# Patient Record
Sex: FEMALE | Race: WHITE | NOT HISPANIC OR LATINO | URBAN - METROPOLITAN AREA
[De-identification: names, ages, dates, MRNs, and addresses within clinical notes are randomized per-mention and may not be internally consistent; named-entity substitution may affect disease eponyms.]

---

## 2023-04-17 ENCOUNTER — HOSPITAL ENCOUNTER (OUTPATIENT)
Facility: CLINIC | Age: 18
Discharge: HOME | End: 2023-04-17
Attending: FAMILY MEDICINE
Payer: COMMERCIAL

## 2023-04-17 VITALS
HEART RATE: 100 BPM | DIASTOLIC BLOOD PRESSURE: 77 MMHG | SYSTOLIC BLOOD PRESSURE: 101 MMHG | TEMPERATURE: 97.2 F | OXYGEN SATURATION: 96 % | BODY MASS INDEX: 28.35 KG/M2 | WEIGHT: 160 LBS | RESPIRATION RATE: 16 BRPM | HEIGHT: 63 IN

## 2023-04-17 DIAGNOSIS — R51.9 ACUTE NONINTRACTABLE HEADACHE, UNSPECIFIED HEADACHE TYPE: Primary | ICD-10-CM

## 2023-04-17 PROCEDURE — S9083 URGENT CARE CENTER GLOBAL: HCPCS | Performed by: NURSE PRACTITIONER

## 2023-04-17 PROCEDURE — 99203 OFFICE O/P NEW LOW 30 MIN: CPT | Performed by: NURSE PRACTITIONER

## 2023-04-17 RX ORDER — NORETHINDRONE ACETATE AND ETHINYL ESTRADIOL .02; 1 MG/1; MG/1
1 TABLET ORAL DAILY
COMMUNITY
Start: 2023-03-24 | End: 2023-09-20

## 2023-04-17 RX ORDER — ISOTRETINOIN 40 MG/1
CAPSULE ORAL
COMMUNITY
Start: 2023-03-06 | End: 2023-09-20

## 2023-04-17 RX ORDER — DOXYCYCLINE 100 MG/1
CAPSULE ORAL
COMMUNITY
Start: 2023-02-02 | End: 2023-09-20

## 2023-04-17 RX ORDER — NAPROXEN 500 MG/1
500 TABLET ORAL EVERY 12 HOURS PRN
Qty: 20 TABLET | Refills: 0 | Status: SHIPPED | OUTPATIENT
Start: 2023-04-17 | End: 2023-09-20

## 2023-04-17 NOTE — ED ATTESTATION NOTE
I was present in the office and provided direct supervison.     Melissa Chahal MD  04/17/23 1503

## 2023-04-17 NOTE — Clinical Note
Skylar Reynolds was seen and treated in our urgent care on 4/17/2023.  She may return to school on 04/19/2023.  Please excuse Skylar for any associated absences.     If you have any questions or concerns, please don't hesitate to call.      Nano Dupree, DNP

## 2023-04-17 NOTE — ED PROVIDER NOTES
[facility]  Urgent Care, Occupational Health, & Travel Medicine  Emergency Medicine/Urgent Care Note  Nano Dupree, ROWAN, DILIP, FNP-BC  Nurse Practitioner    HISTORY OF PRESENT ILLNESS     18 yo patient presenting today for headache symptoms.    Reports:   • Intermittent tension-like headaches ongoing for the last week or so.   • Headaches are mostly frontal. No specific occipital presentation or neck pain/stiffness associated.   • On URI symptoms associated.   • Reports a subj fever last night, but afebrile today. No chills or body aches. Does not fele sick otherwise.   • She states headaches are periodic/intermittent - they self-resolve with tylenol use PRN and rest, but then return in the morning after a night of sleep/rest.     Negatives:  • Headaches are not sharp/shooting. Not waking up in the middle of the night with headaches.   • Reports decreased appetite but no active nausea or recent vomiting.   • Denies blurred vision, diplopia, focal weakness, numbness, tingling, paresthesias.  • No gait disturbance, lack of coordination/decreased coordination.  • There was no recent injury, fall, etc that could explain the symptoms today.   • Does not have a history of headaches or migraines.   • The headaches do not directly result in syncope, fatigue, or vertigo/dizzinness. No focal neuro signs.   • Not triggered by cough or exertion.   • No MS or personality changes with the headache.   • No onset with exercise.   • No tenderness over temples.   • No pattern worsening since onset. No red-flags.           PAST HISTORY     Reviewed from Nursing Triage:         History reviewed. No pertinent past medical history.    History reviewed. No pertinent surgical history.    History reviewed. No pertinent family history.    Social History     Tobacco Use   • Smoking status: Never   • Smokeless tobacco: Never   Vaping Use   • Vaping status: Never Used   Substance Use Topics   • Alcohol use: Yes   • Drug use: Never         REVIEW OF SYSTEMS     • CONST: No objective fevers. No chills. + tiredness/low energy.   • HEENT: + headaches (see neuro). No ear pain/pressure. No difficulty hearing. No hearing changes. No eye-related symptoms. No vision changes. No nasal congestion/nose or sinus pain. No throat pain or painful swallowing.   • NECK: No symptoms reported.   • CV: No CP, palpitations. No edema.   • RESP: No SOB, KNUTSON, cough, wheezing.   • MSK: No pain. Gait remains normal.   • NEURO: + headaches - intermittent and periodic - see HPI. No lightheadedness, dizziness. No n/t. Feels tired/low energy.   • SKIN: No changes reported. No rashes.        VITALS     ED Vitals    Date/Time Temp Pulse Resp BP SpO2 [facility]   04/17/23 1351 36.2 °C (97.2 °F) 100 16 101/77 96 % [facility]             PHYSICAL EXAM     General  • NAD. No pain.   • No fever, chills, tremors at this time.     HEENT  • Head: Normocephalic. Atraumatic.   • Ears: Canals B/L WNL. TMs WNL.   • Eyes: PERRL/3mm. EOMI. No nystagmus. Vision grossly normal. No photophobia.  • Nose: No symptoms reported. No congestion, facial pain, sinus pain. No nasal mucous noted.  • Throat: No pain. No vocal hoarseness.    Chest/Respiratory  • No SOB/KNUTSON. AVSS.   • LCTABL.   • SPO2 WNL on RA.     Cardiovascular  • AVSS. No CP, palpitations, SOB, KNUTSON.   • Carotids are clear on exam B/L. No JVD.   • Peripheral pulses are +2 and WNL.   • No LE edema.   • S1/S2 WNL. Normal rate/rhythm.     MSK  • Normal gait. No pain reported.    Skin/Integ  • Visible skin is w/o abnormalities.     Neuro  • AAO x 3.   • No obvious memory impairments noted or reported.   • Answers all questions appropriately.  • Neuro exam and cranial nerve tests are WNL.   • PERLL/3mm, EOMI, no nystagmus. Hearing and vision are grossly baseline. No dizziness on ambulation or position changes. No lightheadedness or evidence of pre-syncope. Facial movements are equal B/L. No extremity or muscle weakness. Gait is normal. Does not appear  overly fatigued at this time. No distress. Sensation is normal. No tingling or numbness anywhere.         PROCEDURES     Procedures     DATA     Results     None          No orders to display       Scoring tools                                    MDM / ED COURSE / CLINICAL IMPRESSION / DISPO     Clinical Impression      Acute nonintractable headache, unspecified headache type       MDM:    · See overview, HPI, ROS, PE.  No red-flag headache symptoms present at time of exam.  · Neurologic exam completed: all findings on ROS/PE were WNL/baseline.   · Risk factors and triggers r/t headaches and migraines reviewed with the patient.   · Diet changes recommended: Diet low in tyramine-containing foods, diet low in fatty and greasy foods, increasing PO fluids, decreasing dietary caffeine.   · Sent patient prescription for Naproxen 500 BID w/ food (PRN). Will not use with any other OTC NSAIDs.   · Wrote patient a note for missed classes as requested.   · Recommended follow-up with PCP providers from [facility] if she cannot see her own PCP for a further work-up. She verbalized an understanding of this.   · Recommended ER for any worsening.      Disposition  Discharge. Strict return precautions reviewed.              Nano Dupree, ROWAN  04/17/23 1421

## 2023-04-17 NOTE — DISCHARGE INSTRUCTIONS
Take the naproxen twice daily as prescribed. Take each dose with food. Do not use with other OTC NSAIDs (ibuprofen or related medications, aspirin, etc).     Follow-up with this clinic if you are not improving:     [facility]  [address]  [address]  [place] [address] [phone]     Go to the ER for any worsening in headaches, vision changes, vomiting episodes, hearing changes or ringing in the ears, neck pain or stiffness, fever, chills, body aches, chest pain, difficulty breathing, etc.

## 2023-04-17 NOTE — ED NOTES
Bed: 20  Expected date:   Expected time:   Means of arrival: Car  Comments:     Sudha Arce MA  04/17/23 2289

## 2023-04-22 ENCOUNTER — APPOINTMENT (EMERGENCY)
Dept: CT IMAGING | Facility: HOSPITAL | Age: 18
End: 2023-04-22

## 2023-04-22 ENCOUNTER — APPOINTMENT (EMERGENCY)
Dept: ULTRASOUND IMAGING | Facility: HOSPITAL | Age: 18
End: 2023-04-22

## 2023-04-22 ENCOUNTER — HOSPITAL ENCOUNTER (INPATIENT)
Facility: HOSPITAL | Age: 18
LOS: 3 days | Discharge: HOME | End: 2023-04-25
Attending: EMERGENCY MEDICINE | Admitting: FAMILY MEDICINE

## 2023-04-22 DIAGNOSIS — R11.0 NAUSEA: ICD-10-CM

## 2023-04-22 DIAGNOSIS — R74.01 TRANSAMINITIS: ICD-10-CM

## 2023-04-22 DIAGNOSIS — R74.8 ELEVATED LIVER ENZYMES: Primary | ICD-10-CM

## 2023-04-22 DIAGNOSIS — D73.5 SPLENIC INFARCT: ICD-10-CM

## 2023-04-22 PROBLEM — E80.6 HYPERBILIRUBINEMIA: Status: ACTIVE | Noted: 2023-04-22

## 2023-04-22 PROBLEM — R11.2 NAUSEA AND VOMITING: Status: ACTIVE | Noted: 2023-04-22

## 2023-04-22 PROBLEM — L70.9 ACNE: Status: ACTIVE | Noted: 2023-04-22

## 2023-04-22 PROBLEM — Z78.9 ALCOHOL USE: Status: ACTIVE | Noted: 2023-04-22

## 2023-04-22 LAB
ALBUMIN SERPL BCP-MCNC: 3.9 G/DL (ref 3.5–5)
ALP SERPL-CCNC: 289 U/L (ref 34–104)
ALT SERPL W P-5'-P-CCNC: 163 U/L (ref 7–52)
AMPHETAMINES SERPL QL SCN: NEGATIVE
ANION GAP SERPL CALCULATED.3IONS-SCNC: 8 MMOL/L (ref 4–13)
ANISOCYTOSIS BLD QL SMEAR: PRESENT
APAP SERPL-MCNC: <10 UG/ML (ref 10–20)
APTT PPP: 31 SECONDS (ref 23–37)
APTT PPP: 32 SECONDS (ref 23–37)
AST SERPL W P-5'-P-CCNC: 195 U/L (ref 13–39)
BACTERIA UR QL AUTO: ABNORMAL /HPF
BARBITURATES UR QL: NEGATIVE
BASOPHILS # BLD MANUAL: 0.1 THOUSAND/UL (ref 0–0.1)
BASOPHILS NFR MAR MANUAL: 1 % (ref 0–1)
BENZODIAZ UR QL: NEGATIVE
BILIRUB DIRECT SERPL-MCNC: 3.81 MG/DL (ref 0–0.2)
BILIRUB SERPL-MCNC: 5.8 MG/DL (ref 0.2–1)
BILIRUB UR QL STRIP: ABNORMAL
BUN SERPL-MCNC: 7 MG/DL (ref 5–25)
CALCIUM SERPL-MCNC: 9.2 MG/DL (ref 8.4–10.2)
CHLORIDE SERPL-SCNC: 100 MMOL/L (ref 96–108)
CK SERPL-CCNC: 29 U/L (ref 26–192)
CLARITY UR: ABNORMAL
CO2 SERPL-SCNC: 27 MMOL/L (ref 21–32)
COCAINE UR QL: NEGATIVE
COLOR UR: ABNORMAL
CREAT SERPL-MCNC: 0.8 MG/DL (ref 0.6–1.3)
EOSINOPHIL # BLD MANUAL: 0.1 THOUSAND/UL (ref 0–0.4)
EOSINOPHIL NFR BLD MANUAL: 1 % (ref 0–6)
ERYTHROCYTE [DISTWIDTH] IN BLOOD BY AUTOMATED COUNT: 15.1 % (ref 11.6–15.1)
ETHANOL SERPL-MCNC: <10 MG/DL
EXT PREGNANCY TEST URINE: NEGATIVE
EXT. CONTROL: NORMAL
GFR SERPL CREATININE-BSD FRML MDRD: 107 ML/MIN/1.73SQ M
GLUCOSE SERPL-MCNC: 86 MG/DL (ref 65–140)
GLUCOSE UR STRIP-MCNC: ABNORMAL MG/DL
HCT VFR BLD AUTO: 43.1 % (ref 34.8–46.1)
HGB BLD-MCNC: 14.1 G/DL (ref 11.5–15.4)
HGB UR QL STRIP.AUTO: ABNORMAL
INR PPP: 0.99 (ref 0.84–1.19)
KETONES UR STRIP-MCNC: ABNORMAL MG/DL
LDH SERPL-CCNC: 714 U/L (ref 140–271)
LEUKOCYTE ESTERASE UR QL STRIP: NEGATIVE
LG PLATELETS BLD QL SMEAR: PRESENT
LIPASE SERPL-CCNC: 20 U/L (ref 11–82)
LYMPHOCYTES # BLD AUTO: 6.53 THOUSAND/UL (ref 0.6–4.47)
LYMPHOCYTES # BLD AUTO: 65 % (ref 14–44)
MCH RBC QN AUTO: 27.9 PG (ref 26.8–34.3)
MCHC RBC AUTO-ENTMCNC: 32.7 G/DL (ref 31.4–37.4)
MCV RBC AUTO: 85 FL (ref 82–98)
METHADONE UR QL: NEGATIVE
MICROCYTES BLD QL AUTO: PRESENT
MONOCYTES # BLD AUTO: 0.5 THOUSAND/UL (ref 0–1.22)
MONOCYTES NFR BLD: 5 % (ref 4–12)
NEUTROPHILS # BLD MANUAL: 2.21 THOUSAND/UL (ref 1.85–7.62)
NEUTS SEG NFR BLD AUTO: 22 % (ref 43–75)
NITRITE UR QL STRIP: POSITIVE
NON-SQ EPI CELLS URNS QL MICRO: ABNORMAL /HPF
OPIATES UR QL SCN: NEGATIVE
OXYCODONE+OXYMORPHONE UR QL SCN: NEGATIVE
PCP UR QL: NEGATIVE
PH UR STRIP.AUTO: 6.5 [PH]
PLATELET # BLD AUTO: 137 THOUSANDS/UL (ref 149–390)
PLATELET BLD QL SMEAR: ABNORMAL
PMV BLD AUTO: 10.9 FL (ref 8.9–12.7)
POTASSIUM SERPL-SCNC: 3.8 MMOL/L (ref 3.5–5.3)
PROT SERPL-MCNC: 7.8 G/DL (ref 6.4–8.4)
PROT UR STRIP-MCNC: ABNORMAL MG/DL
PROTHROMBIN TIME: 13.2 SECONDS (ref 11.6–14.5)
RBC # BLD AUTO: 5.06 MILLION/UL (ref 3.81–5.12)
RBC #/AREA URNS AUTO: ABNORMAL /HPF
RBC MORPH BLD: PRESENT
SALICYLATES SERPL-MCNC: <5 MG/DL (ref 3–20)
SODIUM SERPL-SCNC: 135 MMOL/L (ref 135–147)
SP GR UR STRIP.AUTO: 1.02 (ref 1–1.03)
THC UR QL: POSITIVE
TRIGL SERPL-MCNC: 275 MG/DL
UROBILINOGEN UR QL STRIP.AUTO: 4 E.U./DL
VARIANT LYMPHS # BLD AUTO: 6 %
WBC # BLD AUTO: 10.05 THOUSAND/UL (ref 4.31–10.16)
WBC #/AREA URNS AUTO: ABNORMAL /HPF

## 2023-04-22 RX ORDER — ONDANSETRON 2 MG/ML
4 INJECTION INTRAMUSCULAR; INTRAVENOUS EVERY 6 HOURS PRN
Status: DISCONTINUED | OUTPATIENT
Start: 2023-04-22 | End: 2023-04-25 | Stop reason: HOSPADM

## 2023-04-22 RX ORDER — IBUPROFEN 400 MG/1
400 TABLET ORAL EVERY 6 HOURS PRN
Status: DISCONTINUED | OUTPATIENT
Start: 2023-04-22 | End: 2023-04-22

## 2023-04-22 RX ORDER — HEPARIN SODIUM 10000 [USP'U]/100ML
3-30 INJECTION, SOLUTION INTRAVENOUS
Status: DISPENSED | OUTPATIENT
Start: 2023-04-22 | End: 2023-04-24

## 2023-04-22 RX ORDER — NORETHINDRONE ACETATE AND ETHINYL ESTRADIOL 1; .02 MG/1; MG/1
1 TABLET ORAL
Status: DISCONTINUED | OUTPATIENT
Start: 2023-04-22 | End: 2023-04-22

## 2023-04-22 RX ORDER — IBUPROFEN 200 MG
200 TABLET ORAL EVERY 6 HOURS PRN
Status: DISCONTINUED | OUTPATIENT
Start: 2023-04-22 | End: 2023-04-25 | Stop reason: HOSPADM

## 2023-04-22 RX ORDER — LANOLIN ALCOHOL/MO/W.PET/CERES
3 CREAM (GRAM) TOPICAL
Status: DISCONTINUED | OUTPATIENT
Start: 2023-04-22 | End: 2023-04-25 | Stop reason: HOSPADM

## 2023-04-22 RX ORDER — HEPARIN SODIUM 1000 [USP'U]/ML
6000 INJECTION, SOLUTION INTRAVENOUS; SUBCUTANEOUS EVERY 6 HOURS PRN
Status: ACTIVE | OUTPATIENT
Start: 2023-04-22 | End: 2023-04-24

## 2023-04-22 RX ORDER — KETOROLAC TROMETHAMINE 30 MG/ML
30 INJECTION, SOLUTION INTRAMUSCULAR; INTRAVENOUS ONCE
Status: COMPLETED | OUTPATIENT
Start: 2023-04-22 | End: 2023-04-22

## 2023-04-22 RX ORDER — NORETHINDRONE ACETATE AND ETHINYL ESTRADIOL 1; .02 MG/1; MG/1
1 TABLET ORAL DAILY
COMMUNITY
Start: 2023-03-24 | End: 2023-04-25

## 2023-04-22 RX ORDER — HYDROXYZINE HYDROCHLORIDE 25 MG/1
25 TABLET, FILM COATED ORAL EVERY 6 HOURS PRN
Status: DISCONTINUED | OUTPATIENT
Start: 2023-04-22 | End: 2023-04-25 | Stop reason: HOSPADM

## 2023-04-22 RX ORDER — ISOTRETINOIN 40 MG/1
CAPSULE ORAL
COMMUNITY
Start: 2023-03-06 | End: 2023-04-25

## 2023-04-22 RX ORDER — PREDNISONE 20 MG/1
40 TABLET ORAL ONCE
Status: COMPLETED | OUTPATIENT
Start: 2023-04-22 | End: 2023-04-22

## 2023-04-22 RX ORDER — ACETYLCYSTEINE 200 MG/ML
3 SOLUTION ORAL; RESPIRATORY (INHALATION) ONCE
Status: DISCONTINUED | OUTPATIENT
Start: 2023-04-22 | End: 2023-04-22

## 2023-04-22 RX ORDER — DIPHENHYDRAMINE HCL 25 MG
25 TABLET ORAL EVERY 6 HOURS PRN
Status: DISCONTINUED | OUTPATIENT
Start: 2023-04-22 | End: 2023-04-25 | Stop reason: HOSPADM

## 2023-04-22 RX ORDER — HEPARIN SODIUM 1000 [USP'U]/ML
3000 INJECTION, SOLUTION INTRAVENOUS; SUBCUTANEOUS EVERY 6 HOURS PRN
Status: DISPENSED | OUTPATIENT
Start: 2023-04-22 | End: 2023-04-24

## 2023-04-22 RX ORDER — ONDANSETRON 2 MG/ML
4 INJECTION INTRAMUSCULAR; INTRAVENOUS ONCE
Status: COMPLETED | OUTPATIENT
Start: 2023-04-22 | End: 2023-04-22

## 2023-04-22 RX ADMIN — HEPARIN SODIUM 18 UNITS/KG/HR: 10000 INJECTION, SOLUTION INTRAVENOUS at 17:53

## 2023-04-22 RX ADMIN — SODIUM CHLORIDE 1000 ML: 0.9 INJECTION, SOLUTION INTRAVENOUS at 14:22

## 2023-04-22 RX ADMIN — PREDNISONE 40 MG: 20 TABLET ORAL at 19:44

## 2023-04-22 RX ADMIN — MELATONIN TAB 3 MG 3 MG: 3 TAB at 23:07

## 2023-04-22 RX ADMIN — ACETYLCYSTEINE 11355 MG: 200 INJECTION, SOLUTION INTRAVENOUS at 18:00

## 2023-04-22 RX ADMIN — DIPHENHYDRAMINE HYDROCHLORIDE 25 MG: 25 TABLET ORAL at 19:44

## 2023-04-22 RX ADMIN — HYDROXYZINE HYDROCHLORIDE 25 MG: 25 TABLET ORAL at 23:07

## 2023-04-22 RX ADMIN — ONDANSETRON 4 MG: 2 INJECTION INTRAMUSCULAR; INTRAVENOUS at 18:53

## 2023-04-22 RX ADMIN — ONDANSETRON 4 MG: 2 INJECTION INTRAMUSCULAR; INTRAVENOUS at 14:22

## 2023-04-22 RX ADMIN — IOHEXOL 100 ML: 350 INJECTION, SOLUTION INTRAVENOUS at 14:42

## 2023-04-22 RX ADMIN — KETOROLAC TROMETHAMINE 30 MG: 30 INJECTION, SOLUTION INTRAMUSCULAR; INTRAVENOUS at 14:22

## 2023-04-22 NOTE — ED PROVIDER NOTES
History  Chief Complaint   Patient presents with   • Headache     Pt was having headaches and nausea, seen at urgent care and given medicine for headache, pt reported symptoms continued so they went to another ER who told them liver enzymes were elevated, states today they started with rash on legs and back as well as yellowing of sclera      12-year-old female presents to the ED for evaluation of headache nausea for several days  Patient states that she was seen outpatient at urgent care initially and given headache medication  She had no resolution of her symptoms so she went to another ED and was told that her liver enzymes are elevated  She states that she has been taking tylenol for her headache and is on accutane for acne and also indulges in alcohol-at times heavily  She has been having a rash to her legs recently and yellowing of the skin  Prior to Admission Medications   Prescriptions Last Dose Informant Patient Reported? Taking? ISOtretinoin (ACCUTANE) 40 MG capsule   Yes Yes   Sig: One by mouth twice daily with a fatty meal   norethindrone-ethinyl estradiol (MICROGESTIN 1/20) 1-20 MG-MCG per tablet   Yes Yes   Sig: Take 1 tablet by mouth daily      Facility-Administered Medications: None       History reviewed  No pertinent past medical history  History reviewed  No pertinent surgical history  History reviewed  No pertinent family history  I have reviewed and agree with the history as documented  E-Cigarette/Vaping   • E-Cigarette Use Never User      E-Cigarette/Vaping Substances     Social History     Tobacco Use   • Smoking status: Never   • Smokeless tobacco: Never   Vaping Use   • Vaping Use: Never used   Substance Use Topics   • Alcohol use: Not Currently   • Drug use: Not Currently       Review of Systems   Constitutional: Negative for chills and fever  HENT: Negative for ear pain and sore throat  Eyes: Negative for pain and visual disturbance     Respiratory: Negative for cough and shortness of breath  Cardiovascular: Negative for chest pain and palpitations  Gastrointestinal: Positive for abdominal pain and nausea  Negative for vomiting  Genitourinary: Negative for dysuria and hematuria  Musculoskeletal: Negative for arthralgias and back pain  Skin: Positive for color change and rash  Neurological: Negative for seizures and syncope  All other systems reviewed and are negative  Physical Exam  Physical Exam  Vitals and nursing note reviewed  Constitutional:       Appearance: Normal appearance  She is well-developed and normal weight  She is not ill-appearing, toxic-appearing or diaphoretic  HENT:      Head: Normocephalic and atraumatic  Right Ear: External ear normal       Left Ear: External ear normal       Nose: Nose normal       Mouth/Throat:      Mouth: Mucous membranes are dry  Eyes:      General: Scleral icterus present  Extraocular Movements: Extraocular movements intact  Conjunctiva/sclera: Conjunctivae normal    Neck:      Vascular: No carotid bruit  Cardiovascular:      Rate and Rhythm: Normal rate and regular rhythm  Pulses: Normal pulses  Heart sounds: Normal heart sounds  No murmur heard  Pulmonary:      Effort: Pulmonary effort is normal  No respiratory distress  Breath sounds: No stridor  No wheezing or rhonchi  Abdominal:      General: Abdomen is flat  Bowel sounds are normal  There is no distension  Palpations: Abdomen is soft  There is no mass  Tenderness: There is no abdominal tenderness  There is no guarding or rebound  Hernia: No hernia is present  Musculoskeletal:         General: No swelling, tenderness, deformity or signs of injury  Normal range of motion  Cervical back: Normal range of motion and neck supple  No rigidity or tenderness  Right lower leg: No edema  Left lower leg: No edema  Lymphadenopathy:      Cervical: No cervical adenopathy     Skin:     General: Skin is warm and dry  Capillary Refill: Capillary refill takes less than 2 seconds  Coloration: Skin is jaundiced  Findings: No bruising or lesion  Comments: No rash noted to lower extremities as previously stated   Neurological:      General: No focal deficit present  Mental Status: She is alert and oriented to person, place, and time  Mental status is at baseline  Cranial Nerves: No cranial nerve deficit  Sensory: No sensory deficit  Motor: No weakness        Coordination: Coordination normal       Gait: Gait normal       Deep Tendon Reflexes: Reflexes normal    Psychiatric:         Mood and Affect: Mood normal          Vital Signs  ED Triage Vitals [04/22/23 1146]   Temperature Pulse Respirations Blood Pressure SpO2   98 1 °F (36 7 °C) 98 20 (!) 138/101 96 %      Temp Source Heart Rate Source Patient Position - Orthostatic VS BP Location FiO2 (%)   Temporal Monitor -- Right arm --      Pain Score       --           Vitals:    04/22/23 1146 04/22/23 1245   BP: (!) 138/101 105/66   Pulse: 98 90         Visual Acuity      ED Medications  Medications   sodium chloride 0 9 % bolus 1,000 mL (has no administration in time range)   ondansetron (ZOFRAN) injection 4 mg (has no administration in time range)   ketorolac (TORADOL) injection 30 mg (has no administration in time range)       Diagnostic Studies  Results Reviewed     Procedure Component Value Units Date/Time    POCT pregnancy, urine [[id number]]     Lab Status: No result     Manual Differential(PHLEBS Do Not Order) [[id number]]  (Abnormal) Collected: 04/22/23 1159    Lab Status: Final result Specimen: Blood from Arm, Left Updated: 04/22/23 1241     Segmented % 22 %      Lymphocytes % 65 %      Monocytes % 5 %      Eosinophils, % 1 %      Basophils % 1 %      Atypical Lymphocytes % 6 %      Absolute Neutrophils 2 21 Thousand/uL      Lymphocytes Absolute 6 53 Thousand/uL      Monocytes Absolute 0 50 Thousand/uL      Eosinophils Absolute 0 10 Thousand/uL      Basophils Absolute 0 10 Thousand/uL      Total Counted --     RBC Morphology Present     Anisocytosis Present     Microcytes Present     Platelet Estimate Borderline     Large Platelet Present    Comprehensive metabolic panel [[id number]]  (Abnormal) Collected: 04/22/23 1159    Lab Status: Final result Specimen: Blood from Arm, Left Updated: 04/22/23 1222     Sodium 135 mmol/L      Potassium 3 8 mmol/L      Chloride 100 mmol/L      CO2 27 mmol/L      ANION GAP 8 mmol/L      BUN 7 mg/dL      Creatinine 0 80 mg/dL      Glucose 86 mg/dL      Calcium 9 2 mg/dL       U/L       U/L      Alkaline Phosphatase 289 U/L      Total Protein 7 8 g/dL      Albumin 3 9 g/dL      Total Bilirubin 5 80 mg/dL      eGFR 107 ml/min/1 73sq m     Narrative:      [place] guidelines for Chronic Kidney Disease (CKD):   •  Stage 1 with normal or high GFR (GFR > 90 mL/min/1 73 square meters)  •  Stage 2 Mild CKD (GFR = 60-89 mL/min/1 73 square meters)  •  Stage 3A Moderate CKD (GFR = 45-59 mL/min/1 73 square meters)  •  Stage 3B Moderate CKD (GFR = 30-44 mL/min/1 73 square meters)  •  Stage 4 Severe CKD (GFR = 15-29 mL/min/1 73 square meters)  •  Stage 5 End Stage CKD (GFR <15 mL/min/1 73 square meters)  Note: GFR calculation is accurate only with a steady state creatinine    Lipase [[id number]]  (Normal) Collected: 04/22/23 1159    Lab Status: Final result Specimen: Blood from Arm, Left Updated: 04/22/23 1222     Lipase 20 u/L     CBC and differential [[id number]]  (Abnormal) Collected: 04/22/23 1159    Lab Status: Final result Specimen: Blood from Arm, Left Updated: 04/22/23 1216     WBC 10 05 Thousand/uL      RBC 5 06 Million/uL      Hemoglobin 14 1 g/dL      Hematocrit 43 1 %      MCV 85 fL      MCH 27 9 pg      MCHC 32 7 g/dL      RDW 15 1 %      MPV 10 9 fL      Platelets 367 Thousands/uL                  US right upper quadrant   Final Result by Quyen Solis MD "(04/22 1342)      1  Unremarkable liver and biliary tree  2   Contracted gallbladder, grossly normal       Workstation performed: [id number]         CT abdomen pelvis with contrast    (Results Pending)              Procedures  Procedures         ED Course  ED Course as of 04/22/23 1611   Sat Apr 22, 2023   1610 Case was discussed with the GI doctor and all labs pertinent to the patient's case were ordered  Patient being started on Mucomyst   Case was discussed with the hospitalist who accept the patient for admission  CRAFFT    Flowsheet Row Most Recent Value   CRAFFT Initial Screen: During the past 12 months, did you:    1  Drink any alcohol (more than a few sips)? No Filed at: 04/22/2023 1143   2  Smoke any marijuana or hashish No Filed at: 04/22/2023 1143   3  Use anything else to get high? (\"anything else\" includes illegal drugs, over the counter and prescription drugs, and things that you sniff or 'allen')? No Filed at: 04/22/2023 1143                                          MDM    Disposition  Final diagnoses:   None     ED Disposition     None      Follow-up Information    None         Patient's Medications   Discharge Prescriptions    No medications on file       No discharge procedures on file      PDMP Review     None          ED Provider  Electronically Signed by           Eric Muhammad DO  04/22/23 1611    "

## 2023-04-22 NOTE — PLAN OF CARE
Problem: PAIN - ADULT  Goal: Verbalizes/displays adequate comfort level or baseline comfort level  Description: Interventions:  - Encourage patient to monitor pain and request assistance  - Assess pain using appropriate pain scale  - Administer analgesics based on type and severity of pain and evaluate response  - Implement non-pharmacological measures as appropriate and evaluate response  - Consider cultural and social influences on pain and pain management  - Notify physician/advanced practitioner if interventions unsuccessful or patient reports new pain  Outcome: Progressing     Problem: DISCHARGE PLANNING  Goal: Discharge to home or other facility with appropriate resources  Description: INTERVENTIONS:  - Identify barriers to discharge w/patient and caregiver  - Arrange for needed discharge resources and transportation as appropriate  - Identify discharge learning needs (meds, wound care, etc )  - Arrange for interpretive services to assist at discharge as needed  - Refer to Case Management Department for coordinating discharge planning if the patient needs post-hospital services based on physician/advanced practitioner order or complex needs related to functional status, cognitive ability, or social support system  Outcome: Progressing     Problem: Knowledge Deficit  Goal: Patient/family/caregiver demonstrates understanding of disease process, treatment plan, medications, and discharge instructions  Description: Complete learning assessment and assess knowledge base    Interventions:  - Provide teaching at level of understanding  - Provide teaching via preferred learning methods  Outcome: Progressing     Problem: METABOLIC, FLUID AND ELECTROLYTES - ADULT  Goal: Electrolytes maintained within normal limits  Description: INTERVENTIONS:  - Monitor labs and assess patient for signs and symptoms of electrolyte imbalances  - Administer electrolyte replacement as ordered  - Monitor response to electrolyte replacements, including repeat lab results as appropriate  - Instruct patient on fluid and nutrition as appropriate  Outcome: Progressing     Problem: HEMATOLOGIC - ADULT  Goal: Maintains hematologic stability  Description: INTERVENTIONS  - Assess for signs and symptoms of bleeding or hemorrhage  - Monitor labs  - Administer supportive blood products/factors as ordered and appropriate  Outcome: Progressing

## 2023-04-22 NOTE — ASSESSMENT & PLAN NOTE
Present on admission with bilirubin direct of 3 8, total bilirubin 5 8  At previous ER/20 had total bilirubin of 1 7  Lab Results   Component Value Date    BILIDIR 3 81 (H) 04/22/2023    TBILI 5 80 (H) 04/22/2023   · Right upper quadrant ultrasound was unremarkable liver and biliary tree, contracted gallbladder which is grossly normal  · Decompressed gallbladder on CT abdomen pelvis  · Continue to trend

## 2023-04-22 NOTE — NURSING NOTE
Patient experiencing new red itchy blotching on her upper chest and back  No respiratory problems noted at this time  Jason Headland made aware

## 2023-04-22 NOTE — ASSESSMENT & PLAN NOTE
· Presents to the ER with headache and nausea for several days  · As needed Zofran -check EKG to monitor QTc  · Monitor for improvement

## 2023-04-22 NOTE — LETTER
[facility]  [address]  [place] [id number]  No information on file  April 25, 2023     Patient: Suzanne Rowe   YOB: 2005   Date of Visit: 4/22/2023       To Whom it May Concern:    Priyank Hammonds is under my professional care  She was seen in the hospital from 4/22/2023 to 04/25/23  She may return to school on 4/26/2023 with the following limitations please allow Nohemy to complete her school work virtually until further follow up is established  She may return to school to take her finals  If you have any questions or concerns, please don't hesitate to call           Sincerely,          Romy Bowen PA-C

## 2023-04-22 NOTE — H&P
[address]  H&P  Name: Georges Landin 21 y o  female I MRN: [id number]  Unit/Bed#: [facility] I Date of Admission: 4/22/2023   Date of Service: 4/22/2023 I Hospital Day: 0      Assessment/Plan   * Transaminitis  Assessment & Plan  · Presents to the ER for evaluation of headache and nausea for several days  · Had no resolution of symptoms, went to another ER 4/20 and told her liver enzymes were elevated  · In Care Everywhere , ALT 88 -has significantly increased today  · Also had total bilirubin of 1 7,   · 4/20 had right upper quadrant ultrasound with hepatic steatosis  · Did have hepatitis screening as an outpatient 2 days ago -negative  · Has been taking Tylenol for headaches, Accutane, does admit to some heavy alcohol use intermittently    Lab Results   Component Value Date     (H) 04/22/2023     (H) 04/22/2023   CT abdomen pelvis with splenomegaly/2 splenic infarcts, unremarkable liver  US right upper quadrant-unremarkable liver and biliary tree, gallbladder grossly normal  Unclear specific etiology  ER discussed with GI provider, recommendations below -formal GI consult on Monday  · Check the following lab work -INR, acute hepatitis panel, EBV, ethanol level, urine tox screen, tylenol level salicylate level, HANNAH, anti-smooth muscle antibody, IgG, LDH and haptoglobin  · Start NAC  · Follow LFTs and INR    Alcohol use  Assessment & Plan  · Reports alcohol use every weekend at school  · Continue to educate on lifestyle changes    Nausea  Assessment & Plan  · Presents to the ER with headache and nausea for several days  · As needed Zofran -check EKG to monitor QTc  · Monitor for improvement    Hyperbilirubinemia  Assessment & Plan  Present on admission with bilirubin direct of 3 8, total bilirubin 5 8  At previous ER/20 had total bilirubin of 1 7  Lab Results   Component Value Date    BILIDIR 3 81 (H) 04/22/2023    TBILI 5 80 (H) 04/22/2023   · Right upper "quadrant ultrasound was unremarkable liver and biliary tree, contracted gallbladder which is grossly normal  · Decompressed gallbladder on CT abdomen pelvis  · Continue to trend      Splenic infarct  Assessment & Plan  · Noted on CT abdomen pelvis-splenomegaly with 2 splenic infarcts, unclear chronicity    Acne  Assessment & Plan  · Patient is on Accutane  · Potential etiology for elevated LFTs       VTE Pharmacologic Prophylaxis: VTE Score: 2 Low Risk (Score 0-2) - Encourage Ambulation  Code Status: Level 1 - Full Code   Discussion with family: Updated  (mother) at bedside  Anticipated Length of Stay: Patient will be admitted on an inpatient basis with an anticipated length of stay of greater than 2 midnights secondary to acute transaminitis  Total Time Spent on Date of Encounter in care of patient: 55 minutes This time was spent on one or more of the following: performing physical exam; counseling and coordination of care; obtaining or reviewing history; documenting in the medical record; reviewing/ordering tests, medications or procedures; communicating with other healthcare professionals and discussing with patient's family/caregivers  Chief Complaint: \"I was having a headache, chills, nausea, vomiting, and hives on my legs\"    History of Present Illness:  Carlos Mantilla is a 21 y o  female with a PMH of acne and HSP who presents with nausea, vomiting, headache, and hives  Patient states that she went to urgent care due to headaches, chills, nausea and vomiting  Patient was given medication for her headaches and was sent home  Patient states headaches do not get better, so she went to emergency room and they did an ultrasound of her right upper quadrant that showed hepatic steatosis  Patient was told not to drink alcohol or take Tylenol for the next 30 days  Patient symptoms still did not improve, so she presented today to the ER    Patient was found to have splenomegaly and elevated " LFTs, alk phos and bilirubin  Patient states she no longer has hives and is no longer itchy  States overall she is feeling a little better  Patient states that she drinks every weekend at college  Patient also states she has been taking all more Tylenol due to her increased headaches  Denies drug use  Patient also states that she has a history of HSP when she was younger  Review of Systems:  Review of Systems   Constitutional: Positive for chills  Negative for diaphoresis, fatigue and fever  HENT: Negative for congestion, rhinorrhea, sneezing and sore throat  Respiratory: Negative for cough, chest tightness, shortness of breath and wheezing  Cardiovascular: Negative for chest pain, palpitations and leg swelling  Gastrointestinal: Positive for nausea and vomiting  Negative for abdominal pain, constipation and diarrhea  Genitourinary: Negative for dysuria  Musculoskeletal: Negative for arthralgias  Skin: Positive for rash  Neurological: Positive for dizziness and headaches  Negative for tremors, syncope and weakness  Psychiatric/Behavioral: Negative for agitation, behavioral problems and confusion  Past Medical and Surgical History:   History reviewed  No pertinent past medical history  History reviewed  No pertinent surgical history  Meds/Allergies:  Prior to Admission medications    Medication Sig Start Date End Date Taking? Authorizing Provider   ISOtretinoin (ACCUTANE) 40 MG capsule One by mouth twice daily with a fatty meal 3/6/23  Yes Historical Provider, MD   norethindrone-ethinyl estradiol ([facility] 1/20) 1-20 MG-MCG per tablet Take 1 tablet by mouth daily 3/24/23  Yes Historical Provider, MD     I have reviewed home medications with patient personally  Allergies:    Allergies   Allergen Reactions   • Tamiflu [Oseltamivir] Hives       Social History:  Marital Status: Single   Occupation: College student  Patient Pre-hospital Living Situation: Home  Patient "Pre-hospital Level of Mobility: walks  Patient Pre-hospital Diet Restrictions: None  Substance Use History:   Social History     Substance and Sexual Activity   Alcohol Use Not Currently     Social History     Tobacco Use   Smoking Status Never   Smokeless Tobacco Never     Social History     Substance and Sexual Activity   Drug Use Not Currently       Family History:  History reviewed  No pertinent family history  Physical Exam:     Vitals:   Blood Pressure: 135/96 (04/22/23 1641)  Pulse: 95 (04/22/23 1641)  Temperature: 99 °F (37 2 °C) (04/22/23 1641)  Temp Source: Temporal (04/22/23 1146)  Respirations: (!) 24 (04/22/23 1641)  Height: 5' 3\" (160 cm) (04/22/23 1146)  Weight - Scale: 75 7 kg (166 lb 14 2 oz) (04/22/23 1146)  SpO2: 95 % (04/22/23 1641)    Physical Exam  Vitals reviewed  Constitutional:       General: She is not in acute distress  Appearance: Normal appearance  She is normal weight  She is not ill-appearing  HENT:      Head: Normocephalic and atraumatic  Nose: Nose normal       Mouth/Throat:      Mouth: Mucous membranes are moist       Pharynx: Oropharynx is clear  Eyes:      Extraocular Movements: Extraocular movements intact  Conjunctiva/sclera: Conjunctivae normal    Cardiovascular:      Rate and Rhythm: Normal rate and regular rhythm  Pulses: Normal pulses  Heart sounds: Normal heart sounds  No murmur heard  Pulmonary:      Effort: Pulmonary effort is normal  No respiratory distress  Breath sounds: Normal breath sounds  No wheezing or rhonchi  Abdominal:      General: Abdomen is flat  Bowel sounds are normal  There is no distension  Palpations: Abdomen is soft  Tenderness: There is no abdominal tenderness  There is no guarding  Musculoskeletal:         General: Normal range of motion  Cervical back: Normal range of motion  Right lower leg: No edema  Left lower leg: No edema  Skin:     General: Skin is warm        Findings: No " lesion or rash  Neurological:      General: No focal deficit present  Mental Status: She is alert and oriented to person, place, and time  Mental status is at baseline  Motor: No weakness  Psychiatric:         Mood and Affect: Mood normal          Behavior: Behavior normal          Thought Content: Thought content normal          Judgment: Judgment normal           Additional Data:     Lab Results:  Results from last 7 days   Lab Units 04/22/23  1159   WBC Thousand/uL 10 05   HEMOGLOBIN g/dL 14 1   HEMATOCRIT % 43 1   PLATELETS Thousands/uL 137*   LYMPHO PCT % 65*   MONO PCT % 5   EOS PCT % 1     Results from last 7 days   Lab Units 04/22/23  1159   SODIUM mmol/L 135   POTASSIUM mmol/L 3 8   CHLORIDE mmol/L 100   CO2 mmol/L 27   BUN mg/dL 7   CREATININE mg/dL 0 80   ANION GAP mmol/L 8   CALCIUM mg/dL 9 2   ALBUMIN g/dL 3 9   TOTAL BILIRUBIN mg/dL 5 80*   ALK PHOS U/L 289*   ALT U/L 163*   AST U/L 195*   GLUCOSE RANDOM mg/dL 86     Results from last 7 days   Lab Units 04/22/23  1613   INR  0 99                   Lines/Drains:  Invasive Devices     Peripheral Intravenous Line  Duration           Peripheral IV 04/22/23 Left Antecubital <1 day                     Imaging: Reviewed radiology reports from this admission including: abdominal/pelvic CT  CT abdomen pelvis with contrast   Final Result by Greg Thomson MD (04/22 0571)      Splenomegaly with 2 splenic infarcts  Trace bibasilar pleural effusions  The study was marked in [facility] for immediate notification  Workstation performed: [id number]         US right upper quadrant   Final Result by Julia Villatoro MD (04/22 4642)      1  Unremarkable liver and biliary tree  2   Contracted gallbladder, grossly normal       Workstation performed: [id number]             EKG and Other Studies Reviewed on Admission:   · EKG: EKG pending  ** Please Note: This note has been constructed using a voice recognition system   **

## 2023-04-22 NOTE — ASSESSMENT & PLAN NOTE
· Presents to the ER for evaluation of headache and nausea for several days  · Had no resolution of symptoms, went to another ER 4/20 and told her liver enzymes were elevated  · In Care Everywhere , ALT 88 -has significantly increased today  · Also had total bilirubin of 1 7,   · 4/20 had right upper quadrant ultrasound with hepatic steatosis  · Did have hepatitis screening as an outpatient 2 days ago -negative  · Has been taking Tylenol for headaches, Accutane, does admit to some heavy alcohol use intermittently    Lab Results   Component Value Date     (H) 04/22/2023     (H) 04/22/2023   CT abdomen pelvis with splenomegaly/2 splenic infarcts, unremarkable liver  US right upper quadrant-unremarkable liver and biliary tree, gallbladder grossly normal  Unclear specific etiology  ER discussed with GI provider, recommendations below -formal GI consult on Monday  · Check the following lab work -INR, acute hepatitis panel, EBV, ethanol level, urine tox screen, tylenol level salicylate level, HANNAH, anti-smooth muscle antibody, IgG, LDH and haptoglobin  · Start NAC  · Follow LFTs and INR

## 2023-04-23 PROBLEM — R11.0 NAUSEA: Status: RESOLVED | Noted: 2023-04-22 | Resolved: 2023-04-23

## 2023-04-23 LAB
ALBUMIN SERPL BCP-MCNC: 3.6 G/DL (ref 3.5–5)
ALP SERPL-CCNC: 265 U/L (ref 34–104)
ALT SERPL W P-5'-P-CCNC: 168 U/L (ref 7–52)
ANA SER QL IA: POSITIVE
ANION GAP SERPL CALCULATED.3IONS-SCNC: 8 MMOL/L (ref 4–13)
APTT PPP: 101 SECONDS (ref 23–37)
APTT PPP: 113 SECONDS (ref 23–37)
APTT PPP: 52 SECONDS (ref 23–37)
APTT PPP: 61 SECONDS (ref 23–37)
AST SERPL W P-5'-P-CCNC: 171 U/L (ref 13–39)
BILIRUB SERPL-MCNC: 5.16 MG/DL (ref 0.2–1)
BUN SERPL-MCNC: 6 MG/DL (ref 5–25)
CALCIUM SERPL-MCNC: 8.8 MG/DL (ref 8.4–10.2)
CHLORIDE SERPL-SCNC: 105 MMOL/L (ref 96–108)
CHOLEST SERPL-MCNC: 172 MG/DL
CO2 SERPL-SCNC: 25 MMOL/L (ref 21–32)
CREAT SERPL-MCNC: 0.66 MG/DL (ref 0.6–1.3)
ERYTHROCYTE [DISTWIDTH] IN BLOOD BY AUTOMATED COUNT: 15.1 % (ref 11.6–15.1)
GFR SERPL CREATININE-BSD FRML MDRD: 128 ML/MIN/1.73SQ M
GLUCOSE SERPL-MCNC: 133 MG/DL (ref 65–140)
HCT VFR BLD AUTO: 41.6 % (ref 34.8–46.1)
HDLC SERPL-MCNC: 25 MG/DL
HGB BLD-MCNC: 13.8 G/DL (ref 11.5–15.4)
INR PPP: 1.02 (ref 0.84–1.19)
LDLC SERPL CALC-MCNC: 89 MG/DL (ref 0–100)
MAGNESIUM SERPL-MCNC: 2.1 MG/DL (ref 1.9–2.7)
MCH RBC QN AUTO: 28.3 PG (ref 26.8–34.3)
MCHC RBC AUTO-ENTMCNC: 33.2 G/DL (ref 31.4–37.4)
MCV RBC AUTO: 85 FL (ref 82–98)
NONHDLC SERPL-MCNC: 147 MG/DL
NRBC BLD AUTO-RTO: 0 /100 WBCS
PLATELET # BLD AUTO: 151 THOUSANDS/UL (ref 149–390)
PMV BLD AUTO: 10.3 FL (ref 8.9–12.7)
POTASSIUM SERPL-SCNC: 4.4 MMOL/L (ref 3.5–5.3)
PROT SERPL-MCNC: 7.4 G/DL (ref 6.4–8.4)
PROTHROMBIN TIME: 13.5 SECONDS (ref 11.6–14.5)
RBC # BLD AUTO: 4.87 MILLION/UL (ref 3.81–5.12)
SODIUM SERPL-SCNC: 138 MMOL/L (ref 135–147)
TRIGL SERPL-MCNC: 288 MG/DL
WBC # BLD AUTO: 8.93 THOUSAND/UL (ref 4.31–10.16)

## 2023-04-23 RX ORDER — DOCUSATE SODIUM 100 MG/1
100 CAPSULE, LIQUID FILLED ORAL 2 TIMES DAILY
Status: DISCONTINUED | OUTPATIENT
Start: 2023-04-23 | End: 2023-04-25 | Stop reason: HOSPADM

## 2023-04-23 RX ADMIN — HEPARIN SODIUM 3000 UNITS: 1000 INJECTION INTRAVENOUS; SUBCUTANEOUS at 23:26

## 2023-04-23 RX ADMIN — DOCUSATE SODIUM 100 MG: 100 CAPSULE ORAL at 17:51

## 2023-04-23 RX ADMIN — IBUPROFEN 200 MG: 200 TABLET, FILM COATED ORAL at 22:14

## 2023-04-23 RX ADMIN — MELATONIN TAB 3 MG 3 MG: 3 TAB at 22:05

## 2023-04-23 RX ADMIN — HEPARIN SODIUM 13 UNITS/KG/HR: 10000 INJECTION, SOLUTION INTRAVENOUS at 16:30

## 2023-04-23 NOTE — PROGRESS NOTES
[address] [facility]  Progress Note  Name: Courtney Brothers  MRN: [id number]  Unit/Bed#: -01 I Date of Admission: 4/22/2023   Date of Service: 4/23/2023 I Hospital Day: 1    Assessment/Plan   * Transaminitis  Assessment & Plan  · Presents to the ER for evaluation of headache and nausea for several days  · Had no resolution of symptoms, went to another ER 4/20 and told her liver enzymes were elevated  · In Care Everywhere , ALT 88 -has significantly increased today  · Also had total bilirubin of 1 7,   · 4/20 had right upper quadrant ultrasound with hepatic steatosis  · Did have hepatitis screening as an outpatient 2 days ago -negative  · Has been taking Tylenol for headaches, Accutane, does admit to weekend alcohol use intermittently  · Possibly infectious (mono), autoimmune (positive HANNAH), or from accutane    Lab Results   Component Value Date     (H) 04/23/2023     (H) 04/22/2023     (H) 04/23/2023     (H) 04/22/2023   · CT abdomen pelvis with splenomegaly/2 splenic infarcts, unremarkable liver  · US right upper quadrant-unremarkable liver and biliary tree, gallbladder grossly normal  · Unclear specific etiology  · ER discussed with GI provider, recommendations below -formal GI consult on Monday  · Check the following lab work -INR, acute hepatitis panel, EBV, ethanol level, urine tox screen, tylenol level salicylate level, HANNAH, anti-smooth muscle antibody, IgG, LDH and haptoglobin  · Patient had allergic reaction to NAC - benadryl and prednisone given  · Follow LFTs and INR  · Positive HANNAH, LDH    Alcohol use  Assessment & Plan  · Reports alcohol use every weekend at school  · Continue to educate on lifestyle changes    Hyperbilirubinemia  Assessment & Plan  Present on admission with bilirubin direct of 3 8, total bilirubin 5 8  At previous ER/20 had total bilirubin of 1 7  Lab Results   Component Value Date    BILIDIR 3 81 (H) 04/22/2023    TBILI 5 16 (H) 04/23/2023   · Right upper quadrant ultrasound was unremarkable liver and biliary tree, contracted gallbladder which is grossly normal  · Decompressed gallbladder on CT abdomen pelvis  · Continue to trend      Splenic infarct  Assessment & Plan  · Noted on CT abdomen pelvis-splenomegaly with 2 splenic infarcts, unclear chronicity  · Currently placed on heparin drip until etiology is discovered  · Hold birth control  · VAS bilateral lower extremity pending    Acne  Assessment & Plan  · Patient is on Accutane  · Potential etiology for elevated LFTs    Nausea-resolved as of 4/23/2023  Assessment & Plan  · Presents to the ER with headache and nausea for several days  · As needed Zofran -check EKG to monitor QTc  · Monitor for improvement         VTE Pharmacologic Prophylaxis: VTE Score: 2 Moderate Risk (Score 3-4) - Pharmacological DVT Prophylaxis Ordered: heparin drip  Patient Centered Rounds: I performed bedside rounds with nursing staff today  Discussions with Specialists or Other Care Team Provider: Gastroenterology    Education and Discussions with Family / Patient: Patient declined call to   Total Time Spent on Date of Encounter in care of patient: 45 minutes This time was spent on one or more of the following: performing physical exam; counseling and coordination of care; obtaining or reviewing history; documenting in the medical record; reviewing/ordering tests, medications or procedures; communicating with other healthcare professionals and discussing with patient's family/caregivers  Current Length of Stay: 1 day(s)  Current Patient Status: Inpatient   Certification Statement: The patient will continue to require additional inpatient hospital stay due to acute transaminitis  Discharge Plan: Anticipate discharge in 48 hrs to home  Code Status: Level 1 - Full Code    Subjective:   Patient states that she is feeling better today and does not have as much nausea    Denies chest pain, abdominal pain, nausea, vomiting or shortness of breath  Objective:     Vitals:   Temp (24hrs), Av 6 °F (37 °C), Min:97 9 °F (36 6 °C), Max:99 °F (37 2 °C)    Temp:  [97 9 °F (36 6 °C)-99 °F (37 2 °C)] 97 9 °F (36 6 °C)  HR:  [78-95] 78  Resp:  [16-24] 16  BP: (117-149)/() 124/92  SpO2:  [95 %-96 %] 96 %  Body mass index is 29 56 kg/m²  Input and Output Summary (last 24 hours): Intake/Output Summary (Last 24 hours) at 2023 1334  Last data filed at 2023 0032  Gross per 24 hour   Intake 400 ml   Output --   Net 400 ml       Physical Exam:   Physical Exam  Vitals reviewed  Constitutional:       General: She is not in acute distress  Appearance: Normal appearance  She is normal weight  She is not ill-appearing  HENT:      Head: Normocephalic and atraumatic  Nose: Nose normal       Mouth/Throat:      Mouth: Mucous membranes are moist       Pharynx: Oropharynx is clear  Eyes:      Extraocular Movements: Extraocular movements intact  Conjunctiva/sclera: Conjunctivae normal    Cardiovascular:      Rate and Rhythm: Normal rate and regular rhythm  Pulses: Normal pulses  Heart sounds: Normal heart sounds  No murmur heard  Pulmonary:      Effort: Pulmonary effort is normal  No respiratory distress  Breath sounds: Normal breath sounds  No wheezing or rhonchi  Abdominal:      General: Abdomen is flat  Bowel sounds are normal  There is no distension  Palpations: Abdomen is soft  Tenderness: There is no abdominal tenderness  There is no right CVA tenderness or guarding  Musculoskeletal:         General: Normal range of motion  Cervical back: Normal range of motion  Right lower leg: No edema  Left lower leg: No edema  Skin:     General: Skin is warm  Neurological:      General: No focal deficit present  Mental Status: She is alert and oriented to person, place, and time  Mental status is at baseline        Motor: No weakness  Psychiatric:         Mood and Affect: Mood normal          Behavior: Behavior normal          Thought Content:  Thought content normal          Judgment: Judgment normal           Additional Data:     Labs:  Results from last 7 days   Lab Units 04/23/23  0511 04/22/23  1159   WBC Thousand/uL 8 93 10 05   HEMOGLOBIN g/dL 13 8 14 1   HEMATOCRIT % 41 6 43 1   PLATELETS Thousands/uL 151 137*   LYMPHO PCT %  --  65*   MONO PCT %  --  5   EOS PCT %  --  1     Results from last 7 days   Lab Units 04/23/23  0511   SODIUM mmol/L 138   POTASSIUM mmol/L 4 4   CHLORIDE mmol/L 105   CO2 mmol/L 25   BUN mg/dL 6   CREATININE mg/dL 0 66   ANION GAP mmol/L 8   CALCIUM mg/dL 8 8   ALBUMIN g/dL 3 6   TOTAL BILIRUBIN mg/dL 5 16*   ALK PHOS U/L 265*   ALT U/L 168*   AST U/L 171*   GLUCOSE RANDOM mg/dL 133     Results from last 7 days   Lab Units 04/23/23  0511   INR  1 02                   Lines/Drains:  Invasive Devices     Peripheral Intravenous Line  Duration           Peripheral IV 04/22/23 Left Antecubital 1 day    Peripheral IV 04/22/23 Left;Ventral (anterior) Forearm <1 day                      Imaging: Reviewed radiology reports from this admission including: abdominal/pelvic CT    Recent Cultures (last 7 days):         Last 24 Hours Medication List:   Current Facility-Administered Medications   Medication Dose Route Frequency Provider Last Rate   • acetylcysteine  50 mg/kg Intravenous Once Love Shelley, PA-C Stopped (04/22/23 1954)    Followed by   • acetylcysteine  100 mg/kg Intravenous Once Love Shelley, PA-C Stopped (04/22/23 1954)   • diphenhydrAMINE  25 mg Oral Q6H PRN Cristiane Wilson MD     • heparin (porcine)  3-30 Units/kg/hr (Order-Specific) Intravenous Titrated Cristiane Wilson MD 13 Units/kg/hr (04/23/23 1038)   • heparin (porcine)  3,000 Units Intravenous Q6H PRN Cristiane Wilson MD     • heparin (porcine)  6,000 Units Intravenous Q6H PRN Cristiane Wilson MD     • hydrOXYzine HCL  25 mg Oral Q6H PRN Shola Maloney APRIL Jimenez     • ibuprofen  200 mg Oral Q6H PRN Lauro Williamson MD     • melatonin  3 mg Oral HS Zelphia APRIL Mercer     • ondansetron  4 mg Intravenous Q6H PRN Whit Ceballos PA-C          Today, Patient Was Seen By: Augustine Nelson PA-C    **Please Note: This note may have been constructed using a voice recognition system  **

## 2023-04-23 NOTE — PLAN OF CARE
Problem: PAIN - ADULT  Goal: Verbalizes/displays adequate comfort level or baseline comfort level  Description: Interventions:  - Encourage patient to monitor pain and request assistance  - Assess pain using appropriate pain scale  - Administer analgesics based on type and severity of pain and evaluate response  - Implement non-pharmacological measures as appropriate and evaluate response  - Consider cultural and social influences on pain and pain management  - Notify physician/advanced practitioner if interventions unsuccessful or patient reports new pain  4/23/2023 0731 by Hank Galindo RN  Outcome: Progressing  4/22/2023 1824 by Hank Galindo RN  Outcome: Progressing     Problem: DISCHARGE PLANNING  Goal: Discharge to home or other facility with appropriate resources  Description: INTERVENTIONS:  - Identify barriers to discharge w/patient and caregiver  - Arrange for needed discharge resources and transportation as appropriate  - Identify discharge learning needs (meds, wound care, etc )  - Arrange for interpretive services to assist at discharge as needed  - Refer to Case Management Department for coordinating discharge planning if the patient needs post-hospital services based on physician/advanced practitioner order or complex needs related to functional status, cognitive ability, or social support system  4/23/2023 0731 by Hank Galindo RN  Outcome: Progressing  4/22/2023 1824 by Hank Galindo RN  Outcome: Progressing     Problem: Knowledge Deficit  Goal: Patient/family/caregiver demonstrates understanding of disease process, treatment plan, medications, and discharge instructions  Description: Complete learning assessment and assess knowledge base    Interventions:  - Provide teaching at level of understanding  - Provide teaching via preferred learning methods  4/23/2023 0731 by Hank Galindo RN  Outcome: Progressing  4/22/2023 1824 by Hank Galindo RN  Outcome: Progressing     Problem: METABOLIC, FLUID AND ELECTROLYTES - ADULT  Goal: Electrolytes maintained within normal limits  Description: INTERVENTIONS:  - Monitor labs and assess patient for signs and symptoms of electrolyte imbalances  - Administer electrolyte replacement as ordered  - Monitor response to electrolyte replacements, including repeat lab results as appropriate  - Instruct patient on fluid and nutrition as appropriate  4/23/2023 0731 by Pili Martínez RN  Outcome: Progressing  4/22/2023 1824 by Pili Martínez RN  Outcome: Progressing     Problem: HEMATOLOGIC - ADULT  Goal: Maintains hematologic stability  Description: INTERVENTIONS  - Assess for signs and symptoms of bleeding or hemorrhage  - Monitor labs  - Administer supportive blood products/factors as ordered and appropriate  4/23/2023 0731 by Pili Martínez RN  Outcome: Progressing  4/22/2023 1824 by Pili Martínez RN  Outcome: Progressing

## 2023-04-23 NOTE — PLAN OF CARE
Problem: PAIN - ADULT  Goal: Verbalizes/displays adequate comfort level or baseline comfort level  Description: Interventions:  - Encourage patient to monitor pain and request assistance  - Assess pain using appropriate pain scale  - Administer analgesics based on type and severity of pain and evaluate response  - Implement non-pharmacological measures as appropriate and evaluate response  - Consider cultural and social influences on pain and pain management  - Notify physician/advanced practitioner if interventions unsuccessful or patient reports new pain  4/22/2023 2341 by Víctor Finley RN  Outcome: Progressing  4/22/2023 2341 by Víctor Finley RN  Outcome: Progressing     Problem: DISCHARGE PLANNING  Goal: Discharge to home or other facility with appropriate resources  Description: INTERVENTIONS:  - Identify barriers to discharge w/patient and caregiver  - Arrange for needed discharge resources and transportation as appropriate  - Identify discharge learning needs (meds, wound care, etc )  - Arrange for interpretive services to assist at discharge as needed  - Refer to Case Management Department for coordinating discharge planning if the patient needs post-hospital services based on physician/advanced practitioner order or complex needs related to functional status, cognitive ability, or social support system  4/22/2023 2341 by Víctor Finley RN  Outcome: Progressing  4/22/2023 2341 by Víctor Finley RN  Outcome: Progressing     Problem: Knowledge Deficit  Goal: Patient/family/caregiver demonstrates understanding of disease process, treatment plan, medications, and discharge instructions  Description: Complete learning assessment and assess knowledge base    Interventions:  - Provide teaching at level of understanding  - Provide teaching via preferred learning methods  4/22/2023 2341 by Víctor Finley RN  Outcome: Progressing  4/22/2023 2341 by Víctor Finley RN  Outcome: Progressing     Problem: METABOLIC, FLUID AND ELECTROLYTES - ADULT  Goal: Electrolytes maintained within normal limits  Description: INTERVENTIONS:  - Monitor labs and assess patient for signs and symptoms of electrolyte imbalances  - Administer electrolyte replacement as ordered  - Monitor response to electrolyte replacements, including repeat lab results as appropriate  - Instruct patient on fluid and nutrition as appropriate  4/22/2023 2341 by Jennifer Storey RN  Outcome: Progressing  4/22/2023 2341 by Jennifer Storey RN  Outcome: Progressing     Problem: HEMATOLOGIC - ADULT  Goal: Maintains hematologic stability  Description: INTERVENTIONS  - Assess for signs and symptoms of bleeding or hemorrhage  - Monitor labs  - Administer supportive blood products/factors as ordered and appropriate  4/22/2023 2341 by Jennifer Storey RN  Outcome: Progressing  4/22/2023 2341 by Jennifer Storey RN  Outcome: Progressing

## 2023-04-23 NOTE — ASSESSMENT & PLAN NOTE
· Presents to the ER for evaluation of headache and nausea for several days  · Had no resolution of symptoms, went to another ER 4/20 and told her liver enzymes were elevated  · In Care Everywhere , ALT 88 -has significantly increased today  · Also had total bilirubin of 1 7,   · 4/20 had right upper quadrant ultrasound with hepatic steatosis  · Did have hepatitis screening as an outpatient 2 days ago -negative  · Has been taking Tylenol for headaches, Accutane, does admit to weekend alcohol use intermittently  · Possibly infectious (mono), autoimmune (positive HANNAH), or from accutane    Lab Results   Component Value Date     (H) 04/23/2023     (H) 04/22/2023     (H) 04/23/2023     (H) 04/22/2023   · CT abdomen pelvis with splenomegaly/2 splenic infarcts, unremarkable liver  · US right upper quadrant-unremarkable liver and biliary tree, gallbladder grossly normal  · Unclear specific etiology  · ER discussed with GI provider, recommendations below -formal GI consult on Monday  · Check the following lab work -INR, acute hepatitis panel, EBV, ethanol level, urine tox screen, tylenol level salicylate level, HANNAH, anti-smooth muscle antibody, IgG, LDH and haptoglobin  · Patient had allergic reaction to NAC - benadryl and prednisone given  · Follow LFTs and INR  · Positive HANNAH, LDH

## 2023-04-23 NOTE — ASSESSMENT & PLAN NOTE
· Noted on CT abdomen pelvis-splenomegaly with 2 splenic infarcts, unclear chronicity  · Currently placed on heparin drip until etiology is discovered  · Hold birth control  · VAS bilateral lower extremity pending

## 2023-04-23 NOTE — ASSESSMENT & PLAN NOTE
Present on admission with bilirubin direct of 3 8, total bilirubin 5 8  At previous ER/20 had total bilirubin of 1 7  Lab Results   Component Value Date    BILIDIR 3 81 (H) 04/22/2023    TBILI 5 16 (H) 04/23/2023   · Right upper quadrant ultrasound was unremarkable liver and biliary tree, contracted gallbladder which is grossly normal  · Decompressed gallbladder on CT abdomen pelvis  · Continue to trend

## 2023-04-23 NOTE — UTILIZATION REVIEW
Initial Clinical Review    Admission: Date/Time/Statement:   Admission Orders (From admission, onward)     Ordered        04/22/23 1606  INPATIENT ADMISSION  Once                      Orders Placed This Encounter   Procedures   • INPATIENT ADMISSION     Standing Status:   Standing     Number of Occurrences:   1     Order Specific Question:   Level of Care     Answer:   Med Surg [16]     Order Specific Question:   Estimated length of stay     Answer:   More than 2 Midnights     Order Specific Question:   Certification     Answer:   I certify that inpatient services are medically necessary for this patient for a duration of greater than two midnights  See H&P and MD Progress Notes for additional information about the patient's course of treatment  ED Arrival Information     Expected   -    Arrival   4/22/2023 11:40    Acuity   Urgent            Means of arrival   Walk-In    Escorted by   Family Member    Service   Hospitalist    Admission type   Emergency            Arrival complaint   elevated liver count, yellow eyes, hives, nausea, orange urine           Chief Complaint   Patient presents with   • Headache     Pt was having headaches and nausea, seen at urgent care and given medicine for headache, pt reported symptoms continued so they went to another ER who told them liver enzymes were elevated, states today they started with rash on legs and back as well as yellowing of sclera        Initial Presentation: 21 y o  female to ED presents for  nausea, vomiting, headache, and hives  Seen at Urgent Care for headaches, chills, nausea and vomiting, given medication for her headaches and was sent home  No improvement with headache  Seen in ED on 4/20, had US RUQ that showed hepatic steatosis  Pt was instructed not to drink alcohol or take Tylenol for next 30 days without any improvement  Came to ED today and found  to have splenomegaly and elevated LFTs, alk phos and bilirubin   Pt states that she drinks every weekend at [employer]  Has been taking increased Tylenol for headaches  PMH for Acne  Admit Inpatient level of care for Transaminitis, Nausea, Hyperbilirubinemia, Splenic infarct and Alcohol use  , ALT 88 -has significantly increased today  Start NAC  Check lab work -INR, acute hepatitis panel, EBV, ethanol level, urine tox screen, tylenol level salicylate level, HANNAH, anti-smooth muscle antibody, IgG, LDH and haptoglobin  Follow LFTs and INR  Antiemetics prn  Check EKG to monitor QTc  Direct bili 3 8 on admit, total bili 5 8  Total bili 1 7 on 4/20  Noted on CT abdomen pelvis-splenomegaly with 2 splenic infarcts, unclear chronicity  Date: 4/23   Day 2:   Progress notes; Possibly infectious (mono), autoimmune (positive HANNAH), or from accutane  Pt  had allergic reaction to NAC - benadryl and prednisone given  Positive HANNAH, LDH  Currently placed on Iv Heparin drip  Hold birth control  VAS bilateral lower extremity pending  ED Triage Vitals   Temperature Pulse Respirations Blood Pressure SpO2   04/22/23 1146 04/22/23 1146 04/22/23 1146 04/22/23 1146 04/22/23 1146   98 1 °F (36 7 °C) 98 20 (!) 138/101 96 %      Temp Source Heart Rate Source Patient Position - Orthostatic VS BP Location FiO2 (%)   04/22/23 1146 04/22/23 1146 -- 04/22/23 1146 --   Temporal Monitor  Right arm       Pain Score       04/22/23 1422       No Pain          Wt Readings from Last 1 Encounters:   04/22/23 75 7 kg (166 lb 14 2 oz) (90 %, Z= 1 30)*     * Growth percentiles are based on CDC (Girls, 2-20 Years) data       Additional Vital Signs:   04/23/23 15:13:07 98 6 °F (37 °C) 92 18 105/64 78 96 %   04/23/23 08:05:36 97 9 °F (36 6 °C) 78 16 124/92 103 96 %   04/22/23 22:49:10 98 8 °F (37 1 °C) 95 22 149/100 116 95 %   04/22/23 16:41:45 99 °F (37 2 °C) 95 24   Abnormal  135/96 109 95 %   04/22/23 1415 -- 87 -- 117/75 90 95 %       Pertinent Labs/Diagnostic Test Results:   CT abdomen pelvis with contrast   Final Result by Humza Reeves MD Russ (04/22 1506)      Splenomegaly with 2 splenic infarcts  Trace bibasilar pleural effusions  The study was marked in [facility] for immediate notification  Workstation performed: [id number]         US right upper quadrant   Final Result by Sanjay Sawyer MD (04/22 1342)      1  Unremarkable liver and biliary tree        2   Contracted gallbladder, grossly normal       Workstation performed: [id number]         VAS lower limb venous duplex study, complete bilateral (4/23) -   (Results Pending)         Results from last 7 days   Lab Units 04/23/23  0511 04/22/23  1159   WBC Thousand/uL 8 93 10 05   HEMOGLOBIN g/dL 13 8 14 1   HEMATOCRIT % 41 6 43 1   PLATELETS Thousands/uL 151 137*         Results from last 7 days   Lab Units 04/23/23  0511 04/22/23  1159   SODIUM mmol/L 138 135   POTASSIUM mmol/L 4 4 3 8   CHLORIDE mmol/L 105 100   CO2 mmol/L 25 27   ANION GAP mmol/L 8 8   BUN mg/dL 6 7   CREATININE mg/dL 0 66 0 80   EGFR ml/min/1 73sq m 128 107   CALCIUM mg/dL 8 8 9 2   MAGNESIUM mg/dL 2 1  --      Results from last 7 days   Lab Units 04/23/23  0511 04/22/23  1550 04/22/23  1159   AST U/L 171*  --  195*   ALT U/L 168*  --  163*   ALK PHOS U/L 265*  --  289*   TOTAL PROTEIN g/dL 7 4  --  7 8   ALBUMIN g/dL 3 6  --  3 9   TOTAL BILIRUBIN mg/dL 5 16*  --  5 80*   BILIRUBIN DIRECT mg/dL  --  3 81*  --          Results from last 7 days   Lab Units 04/23/23  0511 04/22/23  1159   GLUCOSE RANDOM mg/dL 133 86       Results from last 7 days   Lab Units 04/22/23  1613   CK TOTAL U/L 29             Results from last 7 days   Lab Units 04/23/23  0810 04/23/23  0511 04/23/23  0009 04/22/23  1752 04/22/23  1613   PROTIME seconds  --  13 5  --   --  13 2   INR   --  1 02  --   --  0 99   PTT seconds 101*  --  113* 31 32         Results from last 7 days   Lab Units 04/22/23  1159   LIPASE u/L 20       Results from last 7 days   Lab Units 04/22/23  1511   CLARITY UA  Slightly Cloudy   COLOR UA  Nury SPEC GRAV UA  1 020   PH UA  6 5   GLUCOSE UA mg/dl 100 (1/10%)*   KETONES UA mg/dl Trace*   BLOOD UA  Large*   PROTEIN UA mg/dl 30 (1+)*   NITRITE UA  Positive*   BILIRUBIN UA  Large*   UROBILINOGEN UA E U /dl 4 0*   LEUKOCYTES UA  Negative   WBC UA /hpf 0-1   RBC UA /hpf 10-20*   BACTERIA UA /hpf None Seen   EPITHELIAL CELLS WET PREP /hpf Occasional             Results from last 7 days   Lab Units 04/22/23  1511   AMPH/METH  Negative   BARBITURATE UR  Negative   BENZODIAZEPINE UR  Negative   COCAINE UR  Negative   METHADONE URINE  Negative   OPIATE UR  Negative   PCP UR  Negative   THC UR  Positive*     Results from last 7 days   Lab Units 04/22/23  1613 04/22/23  1550   ETHANOL LVL mg/dL <10  --    ACETAMINOPHEN LVL ug/mL  --  <96*   SALICYLATE LVL mg/dL  --  <5       ED Treatment:   Medication Administration from 04/22/2023 1137 to 04/22/2023 1633       Date/Time Order Dose Route Action     04/22/2023 1422 EDT sodium chloride 0 9 % bolus 1,000 mL 1,000 mL Intravenous New Bag     04/22/2023 1422 EDT ondansetron (ZOFRAN) injection 4 mg 4 mg Intravenous Given     04/22/2023 1422 EDT ketorolac (TORADOL) injection 30 mg 30 mg Intravenous Given     04/22/2023 1442 EDT iohexol (OMNIPAQUE) 350 MG/ML injection (SINGLE-DOSE) 100 mL 100 mL Intravenous Given     04/22/2023 1630 EDT acetylcysteine (MUCOMYST) 200 mg/mL inhalation solution 600 mg -- Nebulization Canceled Entry        History reviewed  No pertinent past medical history    Present on Admission:  **None**      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]  Elevated liver enzymes [R74 8]  Headache [R51 9]  Age/Sex: 21 y o  female     Admission Orders:  Scheduled Medications:  acetylcysteine, 50 mg/kg, Intravenous, Once   Followed by  acetylcysteine, 100 mg/kg, Intravenous, Once  melatonin, 3 mg, Oral, HS      Continuous IV Infusions:  heparin (porcine), 3-30 Units/kg/hr (Order-Specific), Intravenous, Titrated      PRN Meds:  diphenhydrAMINE, 25 mg, Oral, Q6H PRN 4/22 x1  heparin (porcine), 3,000 Units, Intravenous, Q6H PRN  heparin (porcine), 6,000 Units, Intravenous, Q6H PRN  hydrOXYzine HCL, 25 mg, Oral, Q6H PRN 4/22 x1  ibuprofen, 200 mg, Oral, Q6H PRN  ondansetron, 4 mg, Intravenous, Q6H PRN        None    Network Utilization Review Department  ATTENTION: Please call with any questions or concerns to [phone] and carefully listen to the prompts so that you are directed to the right person  All voicemails are confidential   Alex Larkin all requests for admission clinical reviews, approved or denied determinations and any other requests to dedicated fax number below belonging to the campus where the patient is receiving treatment   List of dedicated fax numbers for the Facilities:  [address] DENIALS (Administrative/Medical Necessity) [phone]   [address] (Maternity/NICU/Pediatrics) [phone]   [address] [phone]   [facility] [phone]   [address] [place] [address] [facility] [phone]   [address] [place] [address] 815 [facility] [phone]

## 2023-04-24 ENCOUNTER — APPOINTMENT (INPATIENT)
Dept: NON INVASIVE DIAGNOSTICS | Facility: HOSPITAL | Age: 18
End: 2023-04-24

## 2023-04-24 ENCOUNTER — APPOINTMENT (INPATIENT)
Dept: ULTRASOUND IMAGING | Facility: HOSPITAL | Age: 18
End: 2023-04-24

## 2023-04-24 LAB
ALBUMIN SERPL BCP-MCNC: 3.3 G/DL (ref 3.5–5)
ALP SERPL-CCNC: 224 U/L (ref 34–104)
ALT SERPL W P-5'-P-CCNC: 163 U/L (ref 7–52)
ANA HOMOGEN SER QL IF: NORMAL
ANA HOMOGEN TITR SER: NORMAL {TITER}
ANION GAP SERPL CALCULATED.3IONS-SCNC: 8 MMOL/L (ref 4–13)
APTT PPP: 63 SECONDS (ref 23–37)
APTT PPP: 67 SECONDS (ref 23–37)
AST SERPL W P-5'-P-CCNC: 135 U/L (ref 13–39)
ATRIAL RATE: 94 BPM
BILIRUB DIRECT SERPL-MCNC: 0.88 MG/DL (ref 0–0.2)
BILIRUB SERPL-MCNC: 2.04 MG/DL (ref 0.2–1)
BUN SERPL-MCNC: 9 MG/DL (ref 5–25)
CALCIUM ALBUM COR SERPL-MCNC: 9.2 MG/DL (ref 8.3–10.1)
CALCIUM SERPL-MCNC: 8.6 MG/DL (ref 8.4–10.2)
CHLORIDE SERPL-SCNC: 106 MMOL/L (ref 96–108)
CO2 SERPL-SCNC: 25 MMOL/L (ref 21–32)
CREAT SERPL-MCNC: 0.67 MG/DL (ref 0.6–1.3)
GFR SERPL CREATININE-BSD FRML MDRD: 127 ML/MIN/1.73SQ M
GLUCOSE SERPL-MCNC: 82 MG/DL (ref 65–140)
HAV IGM SER QL: NORMAL
HBV CORE IGM SER QL: NORMAL
HBV SURFACE AG SER QL: NORMAL
HCV AB SER QL: NORMAL
INR PPP: 0.96 (ref 0.84–1.19)
LDH SERPL-CCNC: 503 U/L (ref 140–271)
P AXIS: 24 DEGREES
POTASSIUM SERPL-SCNC: 4.4 MMOL/L (ref 3.5–5.3)
PR INTERVAL: 176 MS
PROT SERPL-MCNC: 6.7 G/DL (ref 6.4–8.4)
PROTHROMBIN TIME: 12.9 SECONDS (ref 11.6–14.5)
QRS AXIS: 47 DEGREES
QRSD INTERVAL: 90 MS
QT INTERVAL: 352 MS
QTC INTERVAL: 440 MS
SODIUM SERPL-SCNC: 139 MMOL/L (ref 135–147)
T WAVE AXIS: 22 DEGREES
VENTRICULAR RATE: 94 BPM

## 2023-04-24 RX ADMIN — MELATONIN TAB 3 MG 3 MG: 3 TAB at 21:46

## 2023-04-24 RX ADMIN — APIXABAN 5 MG: 5 TABLET, FILM COATED ORAL at 18:03

## 2023-04-24 RX ADMIN — DOCUSATE SODIUM 100 MG: 100 CAPSULE ORAL at 18:03

## 2023-04-24 RX ADMIN — DOCUSATE SODIUM 100 MG: 100 CAPSULE ORAL at 09:58

## 2023-04-24 RX ADMIN — HEPARIN SODIUM 15 UNITS/KG/HR: 10000 INJECTION, SOLUTION INTRAVENOUS at 14:19

## 2023-04-24 NOTE — ASSESSMENT & PLAN NOTE
· Presents to the ER for evaluation of headache and nausea for several days  · Had no resolution of symptoms, went to another ER 4/20 and told her liver enzymes were elevated  · In Care Everywhere , ALT 88 -has significantly increased today  · Also had total bilirubin of 1 7,   · 4/20 had right upper quadrant ultrasound with hepatic steatosis  · Did have hepatitis screening as an outpatient 2 days ago -negative  · Has been taking Tylenol for headaches, Accutane, does admit to weekend alcohol use intermittently  · Possibly infectious (mono), autoimmune (positive HANNAH), or from accutane    Lab Results   Component Value Date     (H) 04/24/2023     (H) 04/23/2023     (H) 04/24/2023     (H) 04/23/2023   · CT abdomen pelvis with splenomegaly/2 splenic infarcts, unremarkable liver  · US right upper quadrant-unremarkable liver and biliary tree, gallbladder grossly normal  · Unclear specific etiology  · ER discussed with GI provider, recommendations below -formal GI consult on Monday  · Check the following lab work -INR, acute hepatitis panel, EBV, ethanol level, urine tox screen, tylenol level salicylate level, HANNAH, anti-smooth muscle antibody, IgG, LDH and haptoglobin  · Patient had allergic reaction to NAC - benadryl and prednisone given  · Follow LFTs and INR  · Positive HANNAH, LDH; hepatitis panel normal; CMV pending  · GI recommending full abdominal doppler: Splenomegaly  3 3 x 1 7 x 3 0 cm hypoechoic area in the spleen is consistent with known splenic infarct  · Ordered antiphospholipid antibody and factor 5 leiden; also added lyme, babesia, ehrlichia, and anaplasma due to rash, n/v, and elevated LFTs

## 2023-04-24 NOTE — ASSESSMENT & PLAN NOTE
Present on admission with bilirubin direct of 3 8, total bilirubin 5 8  At previous ER/20 had total bilirubin of 1 7  Lab Results   Component Value Date    BILIDIR 0 88 (H) 04/24/2023    TBILI 2 04 (H) 04/24/2023   · Right upper quadrant ultrasound was unremarkable liver and biliary tree, contracted gallbladder which is grossly normal  · Decompressed gallbladder on CT abdomen pelvis  · Continue to trend

## 2023-04-24 NOTE — ASSESSMENT & PLAN NOTE
· Noted on CT abdomen pelvis-splenomegaly with 2 splenic infarcts, unclear chronicity  · Currently placed on heparin drip until etiology is discovered  · Hold birth control  · VAS bilateral lower extremity no evidence of DVT  · Anticipate patient completing 3 months of oral AC therapy and full workup to be done with hematology outpatient  Will discuss with CM for pricing  · Started on Eliquis on 4/24 with script sent to pharmacy  Will call for pricing for 3 months

## 2023-04-24 NOTE — ASSESSMENT & PLAN NOTE
· Patient is on Accutane, recently started 2 months ago along with OCP  · Potential etiology for elevated LFTs  · Accutane discontinued and patient should avoid taking this again in the future

## 2023-04-24 NOTE — UTILIZATION REVIEW
NOTIFICATION OF INPATIENT ADMISSION   AUTHORIZATION REQUEST   SERVICING FACILITY:   [address] [place] [address]  Edgar Graves [address] [place] [address], [address]  Tax ID: [id number]  NPI: [id number] ATTENDING PROVIDER:  Attending Name and NPI#: Mattie Arreola Md [[id number]]  Address: [facility]  Edgar Graves [address] [place] [address], [employer] Rosalia Schuster  Phone: [phone]   ADMISSION INFORMATION:  Place of Service: [place] [address]  Place of Service Code: 21  Inpatient Admission Date/Time: 4/22/23  4:06 PM  Discharge Date/Time: No discharge date for patient encounter  Admitting Diagnosis Code/Description:  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

## 2023-04-24 NOTE — CONSULTS
Consultation - [address] [place] [address] [facility]  Esperanza Parkinson 21 y o  female MRN: [id number]  Unit/Bed#: -01 Encounter: [id number]        Inpatient consult to gastroenterology  Consult performed by: APRIL Moran  Consult ordered by: Brie Collins MD          Reason for Consult / Principal Problem:   Acute elevation LFTs  Jaundice  Splenic infarct  Recent initiation of Accutane and hormonal birth control  Alcohol use  Frequent tylenol use    ASSESSMENT AND PLAN:    Acute elevation LFTs  Jaundice  Splenic infarct  Recent initiation of Accutane and hormonal birth control  Alcohol use  Frequent tylenol use    20 yo female with PMH for acne on accutane started 2 months ago along with hormonal birth control, with frequent tylenol use for headaches, and occasional weekend ETOH use for 2-3 years, with development of dizziness, nausea, chills, headaches and night sweats with development of rash on her legs, last Sunday with evaluation at outside hospital and urgent care where she was told her liver enzymes were elevated  She had ongoing nausea and orange urine and developed jaundice, therefore she presented to ED for further evaluation  Was found to have elevation in liver enzymes and splenic infarct on ct scan, was started on IV heparin and consult placed to GI      AST 02/23/2023 19-->04/22/23 195-->171-->135  ALT 02/23/2023 15-->04/22/23 163-->168-->163  Alkaline phosphatase 02/23/2023 15-->04/22/23 289-->265-->224  Total bilirubin 02/23/2023 0 2-->04/22/23 5 80-->5 16-->2 04  PT 12 9  INR 0 96  Tylenol level was normal  Ethanol level WNL  Ceruloplasmin pending  Urine drug +THC  Myoglobin pending  Acute hep panel negative  CMV pending  EBV pending  HANNAH screen +, titer of 40, cytoplasmic  Haptoglobin pending  Antimitochondrial antibody ordered to r/o PBC  Urine pregnancy test negative    R factor 1 5 consistent with cholestatic injury    MELD NA score 10    Given tylenol use and acute elevation, she was given NAC for elevated liver enzymes, and developed a reaction needing treatment with benadryl and prednisone  Reports improvement in symptoms since admission  RUQ US unremarkable liver and gallbladder    BL LE venous duplex neg    Ct scan a/p with contrast showing splenic infarct, otherwise unremarkable    US abd complete with doppler showing splenomegaly and hypoechoic area in the spleen consistent with known splenic infarct    Etiology is unclear    Splenic infarct being worked up by primary team, question if underlying clotting disorder with acute infarct due to initiation of recent hormonal birth control, agree with factor 5 leiden and cardiolipin antibody evaluation    Consider hematology referral    Consider echocardiogram to assess for potential source for splenic infarct as well as given question of possible hypoperfusion to liver    Fortunately she does not appear to be having acute liver failure given normal INR and no signs of hepatic encephalopathy and continued improvement in liver enzymes since admission    In regards to elevated liver enzymes, etiology may be from Accutane   (Particularly in the setting of concurrent alcohol use) vs hypoperfusion (less likely given no evidence of hepatic vein thrombosis) vs hepatic ischemia, in the setting of oral contraceptive use vs autoimmune    Family history for liver cancer versus cirrhosis and maternal great grandmother, details unknown, otherwise noncontributory    Patient herself denies any personal history of clots, or autoimmune disorders that she is aware of    We will await findings of labs as ordered    Recommend complete alcohol cessation    Recommend holding Accutane given acute elevation in liver enzymes      Adrienne ZAMARRIPA   [facility]  Gastroenterology    Patient plan of care formulated with Dr Doris Jackson     ______________________________________________________________________    HPI:    22 yo female with PMH for acne on accutane started 2 months ago along with hormonal birth control, with frequent tylenol use for headaches, and occasional weekend ETOH use for 2-3 years, with development of dizziness, nausea, chills, headaches and night sweats with development of rash on her legs, last Sunday with evaluation at outside hospital and urgent care where she was told her liver enzymes were elevated  She had ongoing nausea and orange urine and developed jaundice, therefore she presented to ED for further evaluation  Was found to have elevation in liver enzymes and splenic infarct on ct scan, was started on IV heparin and consult placed to GI  AST 02/23/2023 19-->04/22/23 195-->171-->135  ALT 02/23/2023 15-->04/22/23 163-->168-->163  Alkaline phosphatase 02/23/2023 15-->04/22/23 289-->265-->224  Total bilirubin 02/23/2023 0 2-->04/22/23 5 80-->5 16-->2 04  PT 12 9  INR 0 96  Tylenol level was normal  Ethanol level WNL  Ceruloplasmin pending  Urine drug +THC  Myoglobin pending  Acute hep panel negative  CMV pending  EBV pending  HANNAH screen +, titer of 40, cytoplasmic  Haptoglobin pending  Antimitochondrial antibody ordered to r/o PBC  Urine pregnancy test negative    Given tylenol use and acute elevation, she was given NAC for elevated liver enzymes, and developed a reaction needing treatment with benadryl and prednisone  Reports improvement in symptoms since admission  RUQ US unremarkable liver and gallbladder    BL LE venous duplex neg    Ct scan a/p with contrast showing splenic infarct, otherwise unremarkable    US abd complete with doppler showing splenomegaly and hypoechoic area in the spleen consistent with known splenic infarct    Family history for liver cancer versus cirrhosis and maternal great grandmother, details unknown, otherwise noncontributory    Patient herself denies any personal history of clots, or autoimmune disorders that she is aware of    REVIEW OF SYSTEMS:    Review of Systems   Constitutional: Positive for appetite change  Gastrointestinal: Positive for nausea  Negative for abdominal distention, abdominal pain, constipation, diarrhea and vomiting  Skin: Positive for color change and rash  Neurological: Positive for dizziness and headaches  Psychiatric/Behavioral: Negative for confusion  All other systems reviewed and are negative  Historical Information   History reviewed  No pertinent past medical history  History reviewed  No pertinent surgical history  Social History   Social History     Substance and Sexual Activity   Alcohol Use Not Currently     Social History     Substance and Sexual Activity   Drug Use Not Currently     Social History     Tobacco Use   Smoking Status Never   Smokeless Tobacco Never     History reviewed  No pertinent family history      Meds/Allergies     Medications Prior to Admission   Medication   • ISOtretinoin (ACCUTANE) 40 MG capsule   • norethindrone-ethinyl estradiol (MICROGESTIN 1/20) 1-20 MG-MCG per tablet     Current Facility-Administered Medications   Medication Dose Route Frequency   • acetylcysteine (ACETADOTE) 3,785 mg in dextrose 5 % 500 mL IVPB  50 mg/kg Intravenous Once    Followed by   • acetylcysteine (ACETADOTE) 7,570 mg in dextrose 5 % 1,000 mL IVPB  100 mg/kg Intravenous Once   • diphenhydrAMINE (BENADRYL) tablet 25 mg  25 mg Oral Q6H PRN   • docusate sodium (COLACE) capsule 100 mg  100 mg Oral BID   • heparin (porcine) 25,000 units in 0 45% NaCl 250 mL infusion (premix)  3-30 Units/kg/hr (Order-Specific) Intravenous Titrated   • heparin (porcine) injection 3,000 Units  3,000 Units Intravenous Q6H PRN   • heparin (porcine) injection 6,000 Units  6,000 Units Intravenous Q6H PRN   • hydrOXYzine HCL (ATARAX) tablet 25 mg  25 mg Oral Q6H PRN   • ibuprofen (MOTRIN) tablet 200 mg  200 mg Oral Q6H PRN   • melatonin tablet 3 mg  3 mg Oral HS   • ondansetron (ZOFRAN) injection 4 mg  4 mg Intravenous Q6H PRN       Allergies   Allergen Reactions   • Tamiflu [Oseltamivir] Hives   • "Acetadote [Acetylcysteine] Hives, Itching and Rash           Objective     Blood pressure 102/74, pulse 77, temperature 98 1 °F (36 7 °C), resp  rate 19, height 5' 3\" (1 6 m), weight 75 7 kg (166 lb 14 2 oz), SpO2 96 %  Body mass index is 29 56 kg/m²  No intake or output data in the 24 hours ending 04/24/23 1543      PHYSICAL EXAM:      Physical Exam  Vitals and nursing note reviewed  Constitutional:       General: She is not in acute distress  Appearance: Normal appearance  She is not ill-appearing, toxic-appearing or diaphoretic  HENT:      Head: Normocephalic and atraumatic  Mouth/Throat:      Mouth: Mucous membranes are moist       Pharynx: Oropharynx is clear  Eyes:      General: Scleral icterus (mild ) present  Conjunctiva/sclera: Conjunctivae normal    Cardiovascular:      Rate and Rhythm: Normal rate  Pulmonary:      Effort: Pulmonary effort is normal    Abdominal:      General: There is no distension  Palpations: Abdomen is soft  Tenderness: There is no abdominal tenderness  There is no guarding  Musculoskeletal:         General: No swelling  Skin:     General: Skin is warm and dry  Coloration: Skin is not jaundiced  Findings: No rash  Neurological:      General: No focal deficit present  Mental Status: She is alert and oriented to person, place, and time     Psychiatric:         Mood and Affect: Mood normal              Lab Results:   Admission on 04/22/2023   Component Date Value   • WBC 04/22/2023 10 05    • RBC 04/22/2023 5 06    • Hemoglobin 04/22/2023 14 1    • Hematocrit 04/22/2023 43 1    • MCV 04/22/2023 85    • MCH 04/22/2023 27 9    • MCHC 04/22/2023 32 7    • RDW 04/22/2023 15 1    • MPV 04/22/2023 10 9    • Platelets 76/40/2593 137 (L)    • Sodium 04/22/2023 135    • Potassium 04/22/2023 3 8    • Chloride 04/22/2023 100    • CO2 04/22/2023 27    • ANION GAP 04/22/2023 8    • BUN 04/22/2023 7    • Creatinine 04/22/2023 0 80    • Glucose " 04/22/2023 86    • Calcium 04/22/2023 9 2    • AST 04/22/2023 195 (H)    • ALT 04/22/2023 163 (H)    • Alkaline Phosphatase 04/22/2023 289 (H)    • Total Protein 04/22/2023 7 8    • Albumin 04/22/2023 3 9    • Total Bilirubin 04/22/2023 5 80 (H)    • eGFR 04/22/2023 107    • Lipase 04/22/2023 20    • Segmented % 04/22/2023 22 (L)    • Lymphocytes % 04/22/2023 65 (H)    • Monocytes % 04/22/2023 5    • Eosinophils, % 04/22/2023 1    • Basophils % 04/22/2023 1    • Atypical Lymphocytes % 04/22/2023 6 (H)    • Absolute Neutrophils 04/22/2023 2 21    • Lymphocytes Absolute 04/22/2023 6 53 (H)    • Monocytes Absolute 04/22/2023 0 50    • Eosinophils Absolute 04/22/2023 0 10    • Basophils Absolute 04/22/2023 0 10    • RBC Morphology 04/22/2023 Present    • Anisocytosis 04/22/2023 Present    • Microcytes 04/22/2023 Present    • Platelet Estimate 58/89/6246 Borderline (A)    • Large Platelet 30/48/5584 Present    • EXT Preg Test, Ur 04/22/2023 Negative    • Control 04/22/2023 Valid    • Color, UA 04/22/2023 Nury    • Clarity, UA 04/22/2023 Slightly Cloudy    • Specific Gravity, UA 04/22/2023 1 020    • pH, UA 04/22/2023 6 5    • Leukocytes, UA 04/22/2023 Negative    • Nitrite, UA 04/22/2023 Positive (A)    • Protein, UA 04/22/2023 30 (1+) (A)    • Glucose, UA 04/22/2023 100 (1/10%) (A)    • Ketones, UA 04/22/2023 Trace (A)    • Urobilinogen, UA 04/22/2023 4 0 (A)    • Bilirubin, UA 04/22/2023 Large (A)    • Occult Blood, UA 04/22/2023 Large (A)    • RBC, UA 04/22/2023 10-20 (A)    • WBC, UA 04/22/2023 0-1    • Epithelial Cells 04/22/2023 Occasional    • Bacteria, UA 04/22/2023 None Seen    • Acetaminophen Level 21/30/9428 <98 (L)    • Salicylate Lvl 43/36/1325 <5    • Bilirubin, Direct 04/22/2023 3 81 (H)    • Protime 04/22/2023 13 2    • INR 04/22/2023 0 99    • PTT 04/22/2023 32    • Hepatitis B Surface Ag 04/22/2023 Non-reactive    • Hep A IgM 04/22/2023 Non-reactive    • Hepatitis C Ab 04/22/2023 Non-reactive    • Hep B C IgM 04/22/2023 Non-reactive    • Ethanol Lvl 04/22/2023 <10    • HANNAH 04/22/2023 Positive (A)    • LD 04/22/2023 714 (H)    • Amph/Meth UR 04/22/2023 Negative    • Barbiturate Ur 04/22/2023 Negative    • Benzodiazepine Urine 04/22/2023 Negative    • Cocaine Urine 04/22/2023 Negative    • Methadone Urine 04/22/2023 Negative    • Opiate Urine 04/22/2023 Negative    • PCP Ur 04/22/2023 Negative    • THC Urine 04/22/2023 Positive (A)    • Oxycodone Urine 04/22/2023 Negative    • Total CK 04/22/2023 29    • PTT 04/22/2023 31    • Triglycerides 04/22/2023 275 (H)    • PTT 04/23/2023 113 (H)    • Cholesterol 04/23/2023 172    • Triglycerides 04/23/2023 288 (H)    • HDL, Direct 04/23/2023 25 (L)    • LDL Calculated 04/23/2023 89    • Non-HDL-Chol (CHOL-HDL) 04/23/2023 147    • Sodium 04/23/2023 138    • Potassium 04/23/2023 4 4    • Chloride 04/23/2023 105    • CO2 04/23/2023 25    • ANION GAP 04/23/2023 8    • BUN 04/23/2023 6    • Creatinine 04/23/2023 0 66    • Glucose 04/23/2023 133    • Calcium 04/23/2023 8 8    • AST 04/23/2023 171 (H)    • ALT 04/23/2023 168 (H)    • Alkaline Phosphatase 04/23/2023 265 (H)    • Total Protein 04/23/2023 7 4    • Albumin 04/23/2023 3 6    • Total Bilirubin 04/23/2023 5 16 (H)    • eGFR 04/23/2023 128    • Magnesium 04/23/2023 2 1    • WBC 04/23/2023 8 93    • RBC 04/23/2023 4 87    • Hemoglobin 04/23/2023 13 8    • Hematocrit 04/23/2023 41 6    • MCV 04/23/2023 85    • MCH 04/23/2023 28 3    • MCHC 04/23/2023 33 2    • RDW 04/23/2023 15 1    • MPV 04/23/2023 10 3    • Platelets 16/36/9769 151    • nRBC 04/23/2023 0    • Protime 04/23/2023 13 5    • INR 04/23/2023 1 02    • PTT 04/23/2023 101 (H)    • HANNAH Titer 1 04/22/2023 Titer of 40    • HANNAH Pattern 1 04/22/2023 Cytoplasmic (non-nuclear)    • PTT 04/23/2023 61 (H)    • PTT 04/23/2023 52 (H)    • PTT 04/24/2023 63 (H)    • Sodium 04/24/2023 139    • Potassium 04/24/2023 4 4    • Chloride 04/24/2023 106    • CO2 04/24/2023 25    • ANION GAP 04/24/2023 8    • BUN 04/24/2023 9    • Creatinine 04/24/2023 0 67    • Glucose 04/24/2023 82    • Calcium 04/24/2023 8 6    • Corrected Calcium 04/24/2023 9 2    • AST 04/24/2023 135 (H)    • ALT 04/24/2023 163 (H)    • Alkaline Phosphatase 04/24/2023 224 (H)    • Total Protein 04/24/2023 6 7    • Albumin 04/24/2023 3 3 (L)    • Total Bilirubin 04/24/2023 2 04 (H)    • eGFR 04/24/2023 127    • LD 04/24/2023 503 (H)    • Protime 04/24/2023 12 9    • INR 04/24/2023 0 96    • Bilirubin, Direct 04/24/2023 0 88 (H)    • PTT 04/24/2023 67 (H)        Imaging Studies: I have personally reviewed pertinent imaging studies  04/22/2023 RUQ US:  RIGHT UPPER QUADRANT ULTRASOUND     INDICATION:     elevated lft and bilirubin      COMPARISON:  None     TECHNIQUE:   Real-time ultrasound of the right upper quadrant was performed with a curvilinear transducer with both volumetric sweeps and still imaging techniques      FINDINGS:     PANCREAS:  Visualized portions of the pancreas are within normal limits      AORTA AND IVC:  Visualized portions are normal for patient age      LIVER:  Size:  Within normal range  The liver measures 14 9 cm in the midclavicular line  Contour:  Surface contour is smooth  Parenchyma:  Echogenicity and echotexture are within normal limits  No liver mass identified  Limited imaging of the main portal vein shows it to be patent and hepatopetal      BILIARY:  The gallbladder is partially contracted  No wall thickening or pericholecystic fluid  No stones or sludge identified  No sonographic Tesfaye sign  No intrahepatic biliary dilatation  CBD measures 2 0 mm  No choledocholithiasis      KIDNEY:   Right kidney measures 10 0 x 5 0 x 5 1 cm  Volume 132 8 mL  Kidney within normal limits      ASCITES:   None      IMPRESSION:     1  Unremarkable liver and biliary tree      2    Contracted gallbladder, grossly normal     CT a/p with contrast 04/22/2023:  CT ABDOMEN AND PELVIS WITH IV CONTRAST     INDICATION:   elevated LFT      COMPARISON:  Abdominal ultrasound from earlier today     TECHNIQUE:  CT examination of the abdomen and pelvis was performed  Multiplanar 2D reformatted images were created from the source data      Radiation dose length product (DLP) for this visit:  666 mGy-cm   This examination, like all CT scans performed in the [facility], was performed utilizing techniques to minimize radiation dose exposure, including the use of iterative   reconstruction and automated exposure control      IV Contrast:  100 mL of iohexol (OMNIPAQUE)  Enteric Contrast:  Enteric contrast was not administered      FINDINGS:     ABDOMEN     LOWER CHEST:  Trace left pleural effusion      LIVER/BILIARY TREE:  Unremarkable      GALLBLADDER:  Decompressed      SPLEEN:  2 9 cm anterior and 1 9 cm a lateral wedge-shaped hypoenhancing areas in the spleen in keeping with splenic infarcts      Splenomegaly with length of 15 8 cm      PANCREAS:  Unremarkable      ADRENAL GLANDS:  Unremarkable      KIDNEYS/URETERS:  Unremarkable  No hydronephrosis      STOMACH AND BOWEL:  Unremarkable      APPENDIX:  No findings to suggest appendicitis      ABDOMINOPELVIC CAVITY:  There is a small volume of free pelvic fluid, likely physiologic given the patient's age and gender  No pneumoperitoneum  No lymphadenopathy      VESSELS:  Unremarkable for patient's age      PELVIS     REPRODUCTIVE ORGANS:  A tampon is in place      URINARY BLADDER:  Unremarkable      ABDOMINAL WALL/INGUINAL REGIONS:  Unremarkable      OSSEOUS STRUCTURES:  No acute fracture or destructive osseous lesion      IMPRESSION:     Splenomegaly with 2 splenic infarcts      Trace bibasilar pleural effusions  VAS lower limb venous duplex study 04/24/2023:  Impression:  RIGHT LOWER LIMB:  No evidence of acute or chronic deep vein thrombosis  No evidence of superficial thrombophlebitis noted    No evidence of valvular incompetence noted in the deep veins  Popliteal, posterior tibial and anterior tibial arterial Doppler waveforms are  triphasic  LEFT LOWER LIMB:  No evidence of acute or chronic deep vein thrombosis  No evidence of superficial thrombophlebitis noted  No evidence of valvular incompetence noted in the deep veins  Popliteal, posterior tibial and anterior tibial arterial Doppler waveforms are  triphasic  US abd complete with doppler 04/24/2023:  ABDOMEN ULTRASOUND, COMPLETE WITH DOPPLER     INDICATION: elevated LFTs, splenic infarct     COMPARISON: Reference is made to CT abdomen/pelvis dated 4/22/2023      TECHNIQUE:   Real-time ultrasound of the abdomen was performed with a curvilinear transducer with both volumetric sweeps and still imaging techniques      FINDINGS:     PANCREAS:  Obscured by overlying bowel gas      AORTA AND IVC:  Visualized portions are normal for patient age      LIVER:  Size:  Within normal range  The liver measures 15 8 cm in the midclavicular line  Contour:  Surface contour is smooth  Parenchyma:  Echogenicity and echotexture are within normal limits  No liver mass identified      LIVER DOPPLER: The main portal vein and primary branch segments are patent and hepatopetal with normal spectral waveform  Hepatic veins are patent  Spectral waveforms within normal limits  Main hepatic artery appears normal size, patent with normal   spectral waveform      BILIARY:  No gallbladder findings  No sonographic Tesfaye's sign elicited  No intrahepatic biliary dilatation  CBD measures 2 0 mm  No choledocholithiasis      KIDNEY:   Right kidney measures 10 0 x 4 7 x 4 9 cm  Volume 120 2 mL     Left kidney measures 9 9 x 5 8 x 4 3 cm  Volume 130 0 mL     SPLEEN:   Measures 14 3 x 14 2 x 5 2 cm  Volume 556 5 mL  3 3 x 1 7 x 3 0 cm hypoechoic area in the spleen is consistent with known splenic infarct        ASCITES:  None      IMPRESSION:     Splenomegaly   3 3 x 1 7 x 3 0 cm hypoechoic area in the spleen is consistent with known splenic infarct

## 2023-04-24 NOTE — PROGRESS NOTES
[address] [facility]  Progress Note  Name: Skyler Lopez  MRN: [id number]  Unit/Bed#: -01 I Date of Admission: 4/22/2023   Date of Service: 4/24/2023 I Hospital Day: 2    Assessment/Plan   * Transaminitis  Assessment & Plan  · Presents to the ER for evaluation of headache and nausea for several days  · Had no resolution of symptoms, went to another ER 4/20 and told her liver enzymes were elevated  · In Care Everywhere , ALT 88 -has significantly increased today  · Also had total bilirubin of 1 7,   · 4/20 had right upper quadrant ultrasound with hepatic steatosis  · Did have hepatitis screening as an outpatient 2 days ago -negative  · Has been taking Tylenol for headaches, Accutane, does admit to weekend alcohol use intermittently  · Possibly infectious (mono), autoimmune (positive HANNAH), or from accutane    Lab Results   Component Value Date     (H) 04/24/2023     (H) 04/23/2023     (H) 04/24/2023     (H) 04/23/2023   · CT abdomen pelvis with splenomegaly/2 splenic infarcts, unremarkable liver  · US right upper quadrant-unremarkable liver and biliary tree, gallbladder grossly normal  · Unclear specific etiology  · ER discussed with GI provider, recommendations below -formal GI consult on Monday  · Check the following lab work -INR, acute hepatitis panel, EBV, ethanol level, urine tox screen, tylenol level salicylate level, HANNAH, anti-smooth muscle antibody, IgG, LDH and haptoglobin  · Patient had allergic reaction to NAC - benadryl and prednisone given  · Follow LFTs and INR  · Positive HANNAH, LDH; hepatitis panel normal; CMV pending  · GI recommending full abdominal doppler: Splenomegaly  3 3 x 1 7 x 3 0 cm hypoechoic area in the spleen is consistent with known splenic infarct  · Ordered antiphospholipid antibody and factor 5 leiden; also added lyme, babesia, ehrlichia, and anaplasma due to rash, n/v, and elevated LFTs       Alcohol use  Assessment & Plan  · Reports alcohol use every weekend at school  · Continue to educate on lifestyle changes    Hyperbilirubinemia  Assessment & Plan  Present on admission with bilirubin direct of 3 8, total bilirubin 5 8  At previous ER/20 had total bilirubin of 1 7  Lab Results   Component Value Date    BILIDIR 0 88 (H) 04/24/2023    TBILI 2 04 (H) 04/24/2023   · Right upper quadrant ultrasound was unremarkable liver and biliary tree, contracted gallbladder which is grossly normal  · Decompressed gallbladder on CT abdomen pelvis  · Continue to trend    Splenic infarct  Assessment & Plan  · Noted on CT abdomen pelvis-splenomegaly with 2 splenic infarcts, unclear chronicity  · Currently placed on heparin drip until etiology is discovered  · Hold birth control  · VAS bilateral lower extremity no evidence of DVT  · Anticipate patient completing 3 months of oral AC therapy and full workup to be done with hematology outpatient  Will discuss with CM for pricing  · Started on Eliquis on 4/24 with script sent to pharmacy  Will call for pricing for 3 months  Acne  Assessment & Plan  · Patient is on Accutane, recently started 2 months ago along with OCP  · Potential etiology for elevated LFTs  · Accutane discontinued and patient should avoid taking this again in the future  Nausea-resolved as of 4/23/2023  Assessment & Plan  · Presents to the ER with headache and nausea for several days  · As needed Zofran -check EKG to monitor QTc  · Monitor for improvement       VTE Pharmacologic Prophylaxis: VTE Score: 2 Moderate Risk (Score 3-4) - Pharmacological DVT Prophylaxis Ordered: heparin drip  Patient Centered Rounds: I performed bedside rounds with nursing staff today  Discussions with Specialists or Other Care Team Provider: nursing, case management, GI    Education and Discussions with Family / Patient: Updated  (mother and uncle) via phone      Total Time Spent on Date of Encounter in care of patient: 39 minutes This time was spent on one or more of the following: performing physical exam; counseling and coordination of care; obtaining or reviewing history; documenting in the medical record; reviewing/ordering tests, medications or procedures; communicating with other healthcare professionals and discussing with patient's family/caregivers  Current Length of Stay: 2 day(s)  Current Patient Status: Inpatient   Certification Statement: The patient will continue to require additional inpatient hospital stay due to transaminitis  Discharge Plan: Anticipate discharge in 24-48 hrs to home  Code Status: Level 1 - Full Code    Subjective:   Patient seen and examined at bedside this morning  She is feeling better today  Still having some mild nausea but no further vomiting  She denies any abdominal pain  She admits to having chills during the day and sweating at night  She would like to go home soon to study for her finals  Objective:     Vitals:   Temp (24hrs), Av 2 °F (36 8 °C), Min:98 1 °F (36 7 °C), Max:98 2 °F (36 8 °C)    Temp:  [98 1 °F (36 7 °C)-98 2 °F (36 8 °C)] 98 1 °F (36 7 °C)  HR:  [] 102  Resp:  [16-19] 16  BP: (102-130)/(74-95) 130/95  SpO2:  [95 %-96 %] 96 %  Body mass index is 29 56 kg/m²  Input and Output Summary (last 24 hours):   No intake or output data in the 24 hours ending 23 1730    Physical Exam:   Physical Exam  Vitals reviewed  Constitutional:       General: She is not in acute distress  Appearance: She is not ill-appearing or toxic-appearing  HENT:      Head: Normocephalic and atraumatic  Nose: Nose normal       Mouth/Throat:      Mouth: Mucous membranes are moist    Eyes:      Pupils: Pupils are equal, round, and reactive to light  Cardiovascular:      Rate and Rhythm: Normal rate and regular rhythm  Heart sounds: Normal heart sounds  Pulmonary:      Effort: Pulmonary effort is normal  No respiratory distress        Breath sounds: Normal breath sounds  No stridor  No wheezing  Abdominal:      General: Bowel sounds are normal  There is no distension  Palpations: Abdomen is soft  There is no mass  Tenderness: There is no abdominal tenderness  Comments: No tenderness to palpation of the LUQ or RUQ  Musculoskeletal:         General: Normal range of motion  Cervical back: Normal range of motion  Right lower leg: No edema  Left lower leg: No edema  Skin:     General: Skin is warm and dry  Neurological:      General: No focal deficit present  Mental Status: She is alert and oriented to person, place, and time     Psychiatric:         Mood and Affect: Mood normal          Behavior: Behavior normal           Additional Data:     Labs:  Results from last 7 days   Lab Units 04/23/23  0511 04/22/23  1159   WBC Thousand/uL 8 93 10 05   HEMOGLOBIN g/dL 13 8 14 1   HEMATOCRIT % 41 6 43 1   PLATELETS Thousands/uL 151 137*   LYMPHO PCT %  --  65*   MONO PCT %  --  5   EOS PCT %  --  1     Results from last 7 days   Lab Units 04/24/23  0532   SODIUM mmol/L 139   POTASSIUM mmol/L 4 4   CHLORIDE mmol/L 106   CO2 mmol/L 25   BUN mg/dL 9   CREATININE mg/dL 0 67   ANION GAP mmol/L 8   CALCIUM mg/dL 8 6   ALBUMIN g/dL 3 3*   TOTAL BILIRUBIN mg/dL 2 04*   ALK PHOS U/L 224*   ALT U/L 163*   AST U/L 135*   GLUCOSE RANDOM mg/dL 82     Results from last 7 days   Lab Units 04/24/23  0532   INR  0 96                   Lines/Drains:  Invasive Devices     Peripheral Intravenous Line  Duration           Peripheral IV 04/22/23 Left Antecubital 2 days    Peripheral IV 04/22/23 Left;Ventral (anterior) Forearm 1 day    Long-Dwell Peripheral IV (Midline) 62/51/13 Right Cephalic Vein <1 day              Imaging: Reviewed radiology reports from this admission including: US abdomen complete with dopper, VAS venous duplex, CT abdomen pelvis, US RUQ    Recent Cultures (last 7 days):         Last 24 Hours Medication List:   Current Facility-Administered Medications   Medication Dose Route Frequency Provider Last Rate   • acetylcysteine  50 mg/kg Intravenous Once Lynda Denton PA-C Stopped (04/22/23 1954)    Followed by   • acetylcysteine  100 mg/kg Intravenous Once Lynda Denton PA-C Stopped (04/22/23 1954)   • apixaban  5 mg Oral BID Jose Schwab PA-C     • diphenhydrAMINE  25 mg Oral Q6H PRN Mattie Arreola MD     • docusate sodium  100 mg Oral BID Shirley Montanez PA-C     • heparin (porcine)  3-30 Units/kg/hr (Order-Specific) Intravenous Titrated Jose Schwab PA-C 15 Units/kg/hr (04/24/23 1419)   • heparin (porcine)  3,000 Units Intravenous Q6H PRN Jose Schwab PA-C     • heparin (porcine)  6,000 Units Intravenous Q6H PRN Jose Schwab PA-C     • hydrOXYzine HCL  25 mg Oral Q6H PRN APRIL Vázquez     • ibuprofen  200 mg Oral Q6H PRN Mattie Arreola MD     • melatonin  3 mg Oral HS APRIL Vázquez     • ondansetron  4 mg Intravenous Q6H PRN Lynda Denton PA-C          Today, Patient Was Seen By: Jose Schwab PA-C    **Please Note: This note may have been constructed using a voice recognition system  **

## 2023-04-25 ENCOUNTER — TELEPHONE (OUTPATIENT)
Dept: HEMATOLOGY ONCOLOGY | Facility: CLINIC | Age: 18
End: 2023-04-25

## 2023-04-25 VITALS
HEART RATE: 79 BPM | BODY MASS INDEX: 29.57 KG/M2 | WEIGHT: 166.89 LBS | HEIGHT: 63 IN | DIASTOLIC BLOOD PRESSURE: 73 MMHG | OXYGEN SATURATION: 95 % | RESPIRATION RATE: 18 BRPM | SYSTOLIC BLOOD PRESSURE: 120 MMHG | TEMPERATURE: 97.9 F

## 2023-04-25 LAB
ALBUMIN SERPL BCP-MCNC: 3.3 G/DL (ref 3.5–5)
ALP SERPL-CCNC: 241 U/L (ref 34–104)
ALT SERPL W P-5'-P-CCNC: 146 U/L (ref 7–52)
ANION GAP SERPL CALCULATED.3IONS-SCNC: 6 MMOL/L (ref 4–13)
AST SERPL W P-5'-P-CCNC: 95 U/L (ref 13–39)
B BURGDOR IGG+IGM SER-ACNC: <0.2 AI
BILIRUB DIRECT SERPL-MCNC: 0.41 MG/DL (ref 0–0.2)
BILIRUB SERPL-MCNC: 1.55 MG/DL (ref 0.2–1)
BUN SERPL-MCNC: 8 MG/DL (ref 5–25)
CALCIUM ALBUM COR SERPL-MCNC: 9.1 MG/DL (ref 8.3–10.1)
CALCIUM SERPL-MCNC: 8.5 MG/DL (ref 8.4–10.2)
CERULOPLASMIN SERPL-MCNC: 55.2 MG/DL (ref 19–39)
CHLORIDE SERPL-SCNC: 103 MMOL/L (ref 96–108)
CO2 SERPL-SCNC: 29 MMOL/L (ref 21–32)
CREAT SERPL-MCNC: 0.8 MG/DL (ref 0.6–1.3)
EBV NA IGG SER IA-ACNC: <18 U/ML (ref 0–17.9)
GFR SERPL CREATININE-BSD FRML MDRD: 107 ML/MIN/1.73SQ M
GLUCOSE SERPL-MCNC: 78 MG/DL (ref 65–140)
LDH SERPL-CCNC: 469 U/L (ref 140–271)
POTASSIUM SERPL-SCNC: 4.6 MMOL/L (ref 3.5–5.3)
PROT SERPL-MCNC: 6.6 G/DL (ref 6.4–8.4)
SARS-COV-2 RNA RESP QL NAA+PROBE: NEGATIVE
SODIUM SERPL-SCNC: 138 MMOL/L (ref 135–147)

## 2023-04-25 RX ORDER — POLYETHYLENE GLYCOL 3350 17 G/17G
34 POWDER, FOR SOLUTION ORAL ONCE
Status: COMPLETED | OUTPATIENT
Start: 2023-04-25 | End: 2023-04-25

## 2023-04-25 RX ORDER — ONDANSETRON 4 MG/1
4 TABLET, FILM COATED ORAL EVERY 8 HOURS PRN
Qty: 20 TABLET | Refills: 0 | Status: SHIPPED | OUTPATIENT
Start: 2023-04-25

## 2023-04-25 RX ADMIN — APIXABAN 5 MG: 5 TABLET, FILM COATED ORAL at 10:17

## 2023-04-25 RX ADMIN — DOCUSATE SODIUM 100 MG: 100 CAPSULE ORAL at 10:17

## 2023-04-25 RX ADMIN — POLYETHYLENE GLYCOL 3350 34 G: 17 POWDER, FOR SOLUTION ORAL at 10:17

## 2023-04-25 NOTE — CASE MANAGEMENT
Case Management Discharge Planning Note    Patient name [facility]  Location /-47 MRN [id number]  : 2005 Date 2023       Current Admission Date: 2023  Current Admission Diagnosis:Transaminitis   Patient Active Problem List    Diagnosis Date Noted   • Transaminitis 2023   • Acne 2023   • Splenic infarct 2023   • Hyperbilirubinemia 2023   • Alcohol use 2023      LOS (days): 3  Geometric Mean LOS (GMLOS) (days):   Days to GMLOS:     OBJECTIVE:  Risk of Unplanned Readmission Score: 5 36         Current admission status: Inpatient   Preferred Pharmacy:   [address] [place] [address], [address] [place] [id number]  Phone: [phone] Fax: [phone]    Primary Care Provider: Naya Ansari MD    Primary Insurance: [facility]  Secondary Insurance:     DISCHARGE DETAILS:     CM called pt's pharmacy and obtained a price for Eliquis  The cost of $3 per the pharmacy was given to the patient  The patient verbalized understanding

## 2023-04-25 NOTE — PROGRESS NOTES
[facility]  Progress Note - Tiff Garrido 21 y o  female MRN: [id number]    Unit/Bed#: -01 Encounter: [id number]    Assessment/Plan:  Acute elevation LFTs  Jaundice  Splenic infarct  Recent initiation of Accutane and hormonal birth control  Alcohol use  Frequent tylenol use  -Constellation of symptoms are concerning but unfortunately no definitive etiology has been determined at this juncture  -Received NAC treatment however she developed an allergic reaction so this was not completed  -No evidence of hepatic dysfunction or acute liver failure  -Acute hepatitis panel negative  -With clinical picture of elevated liver enzymes and splenic infarcts vasculature thrombus first comes to mind however no evidence of thrombus seen on any imaging modalities to date including CT with contrast and abdominal Dopplers  -Subsequently started on anticoagulation with heparin and now on Eliquis  -Liver enzymes are also downtrending  -No evidence of gallstone or biliary disease  -Endorsement of alcohol use, recent Tylenol use, on Accutane and with excessive weight also brings up several different etiologies for her elevated liver enzymes but would not necessarily explain her splenic infarcts  -A viral illness could also be an etiology given she had symptoms of nausea, night sweats, fatigue and joint pains  -HANNAH positive with low titer which is nonspecific  -Complaining of constipation    Plan:  -Anti-coagulation as per primary team  -Discontinue Accutane  -Complete alcohol cessation  -Limit acetaminophen to 2 g daily  -No NSAIDs  -Avoid hepatotoxic medication  -Check COVID  -Defer OCP management to primary team this could increase the risk of hypercoagulable state  -Repeat hepatic function panel upon discharge in 1 week, follow-up with PCP  -Will need outpatient evaluation by hepatology and hematology      Subjective:   Patient seen and examined  Complaining of constipation  No nausea or vomiting  "Tolerating diet  Objective:     Vitals: Blood pressure 120/73, pulse 79, temperature 97 9 °F (36 6 °C), resp  rate 18, height 5' 3\" (1 6 m), weight 75 7 kg (166 lb 14 2 oz), SpO2 95 %  ,Body mass index is 29 56 kg/m²  Intake/Output Summary (Last 24 hours) at 4/25/2023 0908  Last data filed at 4/24/2023 1815  Gross per 24 hour   Intake 180 ml   Output --   Net 180 ml       Review of Systems: as per HPI  Review of Systems    Physical Exam:     Physical Exam  Constitutional:       General: She is not in acute distress  HENT:      Head: Normocephalic  Eyes:      General: Scleral icterus present  Cardiovascular:      Rate and Rhythm: Normal rate  Pulmonary:      Effort: Pulmonary effort is normal    Abdominal:      General: There is no distension  Palpations: Abdomen is soft  Tenderness: There is no abdominal tenderness  There is no guarding  Musculoskeletal:         General: No swelling  Cervical back: Neck supple  Skin:     Coloration: Skin is jaundiced  Neurological:      Mental Status: She is alert and oriented to person, place, and time     Psychiatric:         Mood and Affect: Mood normal            Invasive Devices     Peripheral Intravenous Line  Duration           Peripheral IV 04/22/23 Left Antecubital 2 days    Peripheral IV 04/22/23 Left;Ventral (anterior) Forearm 2 days    Long-Dwell Peripheral IV (Midline) 04/27/62 Right Cephalic Vein <1 day                        CBC: No results found for: WBC, HGB, HCT, MCV, PLT, ADJUSTEDWBC, MCH, MCHC, RDW, MPV, NRBC,   CMP:   Lab Results   Component Value Date    K 4 6 04/25/2023     04/25/2023    CO2 29 04/25/2023    BUN 8 04/25/2023    CREATININE 0 80 04/25/2023    CALCIUM 8 5 04/25/2023    AST 95 (H) 04/25/2023     (H) 04/25/2023    ALKPHOS 241 (H) 04/25/2023    EGFR 107 04/25/2023     "

## 2023-04-25 NOTE — ASSESSMENT & PLAN NOTE
Present on admission with bilirubin direct of 3 8, total bilirubin 5 8  At previous ER/20 had total bilirubin of 1 7  Lab Results   Component Value Date    BILIDIR 0 41 (H) 04/25/2023    TBILI 1 55 (H) 04/25/2023   · Right upper quadrant ultrasound was unremarkable liver and biliary tree, contracted gallbladder which is grossly normal  · Decompressed gallbladder on CT abdomen pelvis  · Continue to trend  · Follow up with hepatology on discharge

## 2023-04-25 NOTE — ASSESSMENT & PLAN NOTE
· Noted on CT abdomen pelvis-splenomegaly with 2 splenic infarcts, unclear chronicity  · Currently placed on heparin drip until etiology is discovered  · Hold birth control  · VAS bilateral lower extremity no evidence of DVT  · Anticipate patient completing 3 months of oral AC therapy and full workup to be done with hematology outpatient  · Started on Eliquis on 4/24 with script sent to pharmacy  Discussed with pharmacy $3 for 3 month supply  · Follow up with hematology on discharge for further workup  Continue Eliquis with further changes at hematology discretion  Referral placed  Will call patient with further labs as they are able

## 2023-04-25 NOTE — TELEPHONE ENCOUNTER
I called Nohemy to schedule a new patient consultation with [facility] in response to a referral sent to our department  Patient would like to call back with her mother     Another attempt will be made to schedule patient

## 2023-04-25 NOTE — ASSESSMENT & PLAN NOTE
· Reports alcohol use every weekend at school  · Continue to educate on lifestyle changes  · Informed that she should have complete cessation of alcohol

## 2023-04-25 NOTE — DISCHARGE INSTR - AVS FIRST PAGE
Dear Sally Williamson,     It was our pleasure to care for you here at [address]  For follow up as well as any medication refills, we recommend that you follow up with your primary care physician  Here are the most important instructions/ recommendations at discharge:     Notable Medication Adjustments -   Start taking Eliquis 5 mg one tablet by mouth twice daily  Stop taking Accutane and birth control at this time  Testing Required after Discharge -   Repeat CMP on Friday 4/28 and Monday 5/1  Important follow up information -   Follow up with hematology outpatient for further workup of splenic infarcts  A referral was placed and they should call you for further follow up  If they do not call you, please call for an appointment  Please follow up with hepatology due to elevated liver enzymes  Follow up with PCP in 1 week  Other Instructions -   Please return to the emergency department if you start having shortness of breath, dizziness, confusion, worsening abdominal pain, chest pain, uncontrolled nausea and vomiting  Please continue taking Eliquis at this time and follow up with hematology for further results  We will call you with further results as they are available  It is important to have complete alcohol cessation  Limit acetaminophen to 2 grams daily  Do not take any NSAIDs such as ibuprofen  Please review this entire after visit summary as additional general instructions including medication list, appointments, activity, diet, any pertinent wound care, and other additional recommendations from your care team that may be provided for you        Sincerely,     Kiara Stoner PA-C

## 2023-04-25 NOTE — ASSESSMENT & PLAN NOTE
· Presents to the ER for evaluation of headache and nausea for several days  · Had no resolution of symptoms, went to another ER 4/20 and told her liver enzymes were elevated  · In Care Everywhere , ALT 88 -has significantly increased today  · Also had total bilirubin of 1 7,   · 4/20 had right upper quadrant ultrasound with hepatic steatosis  · Did have hepatitis screening as an outpatient 2 days ago -negative  · Has been taking Tylenol for headaches, Accutane, does admit to weekend alcohol use intermittently  · Possibly infectious (mono), autoimmune (positive HANNAH), or from accutane    Lab Results   Component Value Date    AST 95 (H) 04/25/2023     (H) 04/24/2023     (H) 04/25/2023     (H) 04/24/2023   · CT abdomen pelvis with splenomegaly/2 splenic infarcts, unremarkable liver  · US right upper quadrant-unremarkable liver and biliary tree, gallbladder grossly normal  · Unclear specific etiology  · ER discussed with GI provider, recommendations below: check labs: INR, ethanol, tylenol, salicylate level normal, tox screen positive THC  · HANNAH positive with titer of 40   · Patient had allergic reaction to NAC - benadryl and prednisone given  · Follow LFTs and INR  · GI recommending full abdominal doppler: Splenomegaly  3 3 x 1 7 x 3 0 cm hypoechoic area in the spleen is consistent with known splenic infarct  · Pending antimitochondrial antibody, anaplasma phagocytophilum PCR, ehrlichia antibody, babesia microti antibody, factor 5 leiden, cardiolipin antibody, CMV IgG/IgM antibody, anti-smooth muscle antibody, IgG, haptoglobin, EBV nuclear antigen antibody IgG, myoglobin urine  · Per GI recommendations: follow up with hematology and hepatology outpatient  Avoid NSAIDs, no more than 2 g tylenol daily, alcohol cessation, discontinue accutane     · Referral placed for hepatology

## 2023-04-25 NOTE — DISCHARGE SUMMARY
[address] Adriane Evangelista  Discharge- [facility] 2005, 21 y o  female MRN: [id number]  Unit/Bed#: -01 Encounter: [id number]  Primary Care Provider: Sherrin Rubinstein, MD   Date and time admitted to hospital: 4/22/2023 11:41 AM    * Transaminitis  Assessment & Plan  · Presents to the ER for evaluation of headache and nausea for several days  · Had no resolution of symptoms, went to another ER 4/20 and told her liver enzymes were elevated  · In Care Everywhere , ALT 88 -has significantly increased today  · Also had total bilirubin of 1 7,   · 4/20 had right upper quadrant ultrasound with hepatic steatosis  · Did have hepatitis screening as an outpatient 2 days ago -negative  · Has been taking Tylenol for headaches, Accutane, does admit to weekend alcohol use intermittently  · Possibly infectious (mono), autoimmune (positive HANNAH), or from accutane    Lab Results   Component Value Date    AST 95 (H) 04/25/2023     (H) 04/24/2023     (H) 04/25/2023     (H) 04/24/2023   · CT abdomen pelvis with splenomegaly/2 splenic infarcts, unremarkable liver  · US right upper quadrant-unremarkable liver and biliary tree, gallbladder grossly normal  · Unclear specific etiology  · ER discussed with GI provider, recommendations below: check labs: INR, ethanol, tylenol, salicylate level normal, tox screen positive THC  · HANNAH positive with titer of 40   · Patient had allergic reaction to NAC - benadryl and prednisone given  · Follow LFTs and INR  · GI recommending full abdominal doppler: Splenomegaly  3 3 x 1 7 x 3 0 cm hypoechoic area in the spleen is consistent with known splenic infarct     · Pending antimitochondrial antibody, anaplasma phagocytophilum PCR, ehrlichia antibody, babesia microti antibody, factor 5 leiden, cardiolipin antibody, CMV IgG/IgM antibody, anti-smooth muscle antibody, IgG, haptoglobin, EBV nuclear antigen antibody IgG, myoglobin urine  · Per GI recommendations: follow up with hematology and hepatology outpatient  Avoid NSAIDs, no more than 2 g tylenol daily, alcohol cessation, discontinue accutane  · Referral placed for hepatology    Alcohol use  Assessment & Plan  · Reports alcohol use every weekend at school  · Continue to educate on lifestyle changes  · Informed that she should have complete cessation of alcohol    Hyperbilirubinemia  Assessment & Plan  Present on admission with bilirubin direct of 3 8, total bilirubin 5 8  At previous ER/20 had total bilirubin of 1 7  Lab Results   Component Value Date    BILIDIR 0 41 (H) 04/25/2023    TBILI 1 55 (H) 04/25/2023   · Right upper quadrant ultrasound was unremarkable liver and biliary tree, contracted gallbladder which is grossly normal  · Decompressed gallbladder on CT abdomen pelvis  · Continue to trend  · Follow up with hepatology on discharge  Splenic infarct  Assessment & Plan  · Noted on CT abdomen pelvis-splenomegaly with 2 splenic infarcts, unclear chronicity  · Currently placed on heparin drip until etiology is discovered  · Hold birth control  · VAS bilateral lower extremity no evidence of DVT  · Anticipate patient completing 3 months of oral AC therapy and full workup to be done with hematology outpatient  · Started on Eliquis on 4/24 with script sent to pharmacy  Discussed with pharmacy $3 for 3 month supply  · Follow up with hematology on discharge for further workup  Continue Eliquis with further changes at hematology discretion  Referral placed  Will call patient with further labs as they are able  Acne  Assessment & Plan  · Patient is on Accutane, recently started 2 months ago along with OCP  · Potential etiology for elevated LFTs  · Accutane discontinued and patient should avoid taking this again in the future  · Continue follow up with PCP or dermatology for further recommendations       Nausea-resolved as of 4/23/2023  Assessment & Plan  · Presents to the ER with headache and nausea for several days  · As needed Zofran -check EKG to monitor QTc  · Monitor for improvement    Medical Problems     Resolved Problems  Date Reviewed: 4/24/2023          Resolved    Nausea 4/23/2023     Resolved by  Celena Munoz PA-C        Discharging Physician / Practitioner: Wilberto Savage PA-C  PCP: Isaiah Cuba MD  Admission Date:   Admission Orders (From admission, onward)     Ordered        04/22/23 1606  INPATIENT ADMISSION  Once                      Discharge Date: 04/25/23    Consultations During Hospital Stay:  · GI    Procedures Performed:   · None    Significant Findings / Test Results:   · US abdomen complete with doppler: PANCREAS:  Obscured by overlying bowel gas  AORTA AND IVC:  Visualized portions are normal for patient age  LIVER: Size:  Within normal range  The liver measures 15 8 cm in the midclavicular line  Contour:  Surface contour is smooth  Parenchyma:  Echogenicity and echotexture are within normal limits  No liver mass identified  LIVER DOPPLER: The main portal vein and primary branch segments are patent and hepatopetal with normal spectral waveform  Hepatic veins are patent  Spectral waveforms within normal limits  Main hepatic artery appears normal size, patent with normal spectral waveform  BILIARY: No gallbladder findings  No sonographic Tesfaye's sign elicited  No intrahepatic biliary dilatation  CBD measures 2 0 mm  No choledocholithiasis  KIDNEY:  Right kidney measures 10 0 x 4 7 x 4 9 cm  Volume 120 2 mL  Left kidney measures 9 9 x 5 8 x 4 3 cm  Volume 130 0 mL  SPLEEN: Measures 14 3 x 14 2 x 5 2 cm  Volume 556 5 mL  3 3 x 1 7 x 3 0 cm hypoechoic area in the spleen is consistent with known splenic infarct  ASCITES:  None  IMPRESSION: Splenomegaly  3 3 x 1 7 x 3 0 cm hypoechoic area in the spleen is consistent with known splenic infarct     · VAS venous duplex complete bilateral: Impression: RIGHT LOWER LIMB: No evidence of acute or chronic deep vein thrombosis  No evidence of superficial thrombophlebitis noted  No evidence of valvular incompetence noted in the deep veins  Popliteal, posterior tibial and anterior tibial arterial Doppler waveforms are triphasic  LEFT LOWER LIMB: No evidence of acute or chronic deep vein thrombosis  No evidence of superficial thrombophlebitis noted  No evidence of valvular incompetence noted in the deep veins  Popliteal, posterior tibial and anterior tibial arterial Doppler waveforms are triphasic  · CT abdomen pelvis: Splenomegaly with 2 splenic infarcts  Trace bibasilar pleural effusions  · US RUQ: PANCREAS:  Visualized portions of the pancreas are within normal limits  AORTA AND IVC:  Visualized portions are normal for patient age  LIVER: Size:  Within normal range  The liver measures 14 9 cm in the midclavicular line  Contour:  Surface contour is smooth  Parenchyma:  Echogenicity and echotexture are within normal limits  No liver mass identified  Limited imaging of the main portal vein shows it to be patent and hepatopetal  BILIARY: The gallbladder is partially contracted  No wall thickening or pericholecystic fluid  No stones or sludge identified  No sonographic Tesfaye sign  No intrahepatic biliary dilatation  CBD measures 2 0 mm  No choledocholithiasis  KIDNEY:  Right kidney measures 10 0 x 5 0 x 5 1 cm  Volume 132 8 mL  Kidney within normal limits  ASCITES:   None  IMPRESSION: 1  Unremarkable liver and biliary tree  2   Contracted gallbladder, grossly normal   · LD: 714  · Bilirubin, direct: 3 81  · AST: 195; ALT: 168; Alk phos: 289; total bilirubin: 5 80  · Lyme normal  · Lipid panel: triglycerides: 288, cholesterol: 172, HDL: 25, LDL: 89  · Total CK: 29  · HANNAH screen w/reflex: positive; HANNAH titer: HANNAH titer 1: titer of 40; HANNAH pattern 1: cytoplasmic (non-nuclear); There is lack of consensus regarding the clinical significance of cytoplasmic (non-nuclear) staining   Antibodies to Ribosomal P and Donna-1 have been associated with cytoplasmic staining characteristics observed by IFA methodology  · Ethanol normal  · Acute hepatitis panel normal  · Ceruloplasmin:  38 8  · Salicylate: <5  · Acetaminophen level: <10  · Rapid drug screen: positive THC  · UA: positive nitrite, positive protein, glucose 100, trace ketones, urobilinogen: 4 0, bilirubin large, occult blood large; microscopic: RBC: 10-20  · Urine pregnancy: negative    Incidental Findings:   · See above   · I reviewed the above mentioned incidental findings with the patient and/or family and they expressed understanding  Discussed splenic infarcts - patient knows to follow up with hematology and hepatology  Test Results Pending at Discharge (will require follow up): · Antimitochondrial antibody  · Anaplasma phagocytophilum, PCR  · Ehrlichia antibody  · Babesia microti antibody  · Factor 5 leiden  · Cardiolipin antibody  · CMV IgG/IgM antibodies  · Anti-smooth muscle antibody, IgG  · Haptoglobin  · EBV nuclear antigen antibody, IgG  · Myoglobin, urine     Outpatient Tests Requested:  · Repeat CMP on Friday 4/28 and Monday 5/1  · Follow up with hematology outpatient for further workup of splenic infarcts  A referral was placed  · Follow up with hepatology due to elevated liver enzymes  · Follow up with PCP in 1 week    Complications:  None    Reason for Admission: acute transaminitis    Hospital Course:   Joie Lincoln is a pleasant 21 y o  female patient with PMH of acne on accutane and HSP who originally presented to the hospital on 4/22/2023 due to nausea, vomiting, headache, and hives  In ED noted to have transaminitis and RUQ U/S and CT scan of the abdomen was done with results as above  Noted to have splenomegaly and splenic infarcts  Liver unremarkable on CT and RUQ U/S  GI was consulted regarding this  Discussed with [facility] and recommended N-acetylcysteine protocol and monitor CMP  Started on heparin drip and discontinued birth control and Accutane  Patient had allergic reaction to NAC, benadryl and prednisone given and patient without further complaints  GI evaluated patient and recommending to consider hematology referral, recommend holding accutane and complete alcohol cessation  GI wanting full abdominal ultrasound with results as above  Ordered Cardiolipin antibody and factor 5 leiden  Due to having rash, ordered tick borne illness labs  These labs were pending on discharge along with labs mentioned above  Patient transitioned off heparin drip and placed on Eliquis  Patient should complete 3 months of Eliquis and further adjustment to be made by outpatient hematology  Script sent to pharmacy and noted to be $3 for 3 months of Eliquis for patient  She no longer was having complaints of nausea or vomiting  Further recommendations per GI: avoid hepatotoxic medication, check COVID (negative), no NSAIDs, limit acetaminophen 2 g qd, discontinue accutane, complete alcohol cessation  Recommending outpatient hepatology and hematology follow up  Referrals were placed for patient  Discussed with patient and patient's mother over the phone that she should continue to follow up with further workup for splenic infarct and possible autoimmune disorders with outpatient hematology  Due to elevated liver enzymes, should follow up with hepatology as well  Repeat CMP on Friday 4/28 and Monday 5/1  Follow up with hepatology due to elevated liver enzymes  Follow up with hematology for further workup  Follow up with PCP in 1 week  Informed to stop taking Accutane and OCP on discharge  Continue taking Eliquis 5 mg bid x 3 months unless otherwise instructed by hematology  Will call with results of labs as they come back  Please see above list of diagnoses and related plan for additional information  Condition at Discharge: stable    Discharge Day Visit / Exam:   Subjective:  Patient seen and examined at bedside this morning   She is feeling well today and offers complaints "of mild constipation, but no other acute complaints  She is anxious regarding her finals coming up in 2-3 weeks  She denies nausea, vomiting, fever, chills, chest pain, SOB, diarrhea, abdominal pain  She is looking forward to going home  Tolerating her diet well  Vitals: Blood Pressure: 120/73 (04/25/23 0709)  Pulse: 79 (04/25/23 0709)  Temperature: 97 9 °F (36 6 °C) (04/25/23 0709)  Temp Source: Temporal (04/22/23 1146)  Respirations: 18 (04/25/23 0709)  Height: 5' 3\" (160 cm) (04/22/23 1146)  Weight - Scale: 75 7 kg (166 lb 14 2 oz) (04/22/23 1146)  SpO2: 95 % (04/25/23 0709)  Exam:   Physical Exam  Vitals reviewed  Constitutional:       General: She is not in acute distress  Appearance: She is not ill-appearing or toxic-appearing  HENT:      Head: Normocephalic and atraumatic  Nose: Nose normal       Mouth/Throat:      Mouth: Mucous membranes are moist    Eyes:      Pupils: Pupils are equal, round, and reactive to light  Cardiovascular:      Rate and Rhythm: Normal rate and regular rhythm  Heart sounds: Normal heart sounds  Pulmonary:      Effort: Pulmonary effort is normal  No respiratory distress  Breath sounds: Normal breath sounds  No stridor  No wheezing  Abdominal:      General: Bowel sounds are normal  There is no distension  Palpations: Abdomen is soft  There is no mass  Tenderness: There is no abdominal tenderness  Musculoskeletal:         General: Normal range of motion  Cervical back: Normal range of motion  Right lower leg: No edema  Left lower leg: No edema  Skin:     General: Skin is warm and dry  Neurological:      General: No focal deficit present  Mental Status: She is alert and oriented to person, place, and time  Psychiatric:         Mood and Affect: Mood normal          Behavior: Behavior normal           Discussion with Family: Updated  (mother) via phone      Discharge instructions/Information to patient and " family:   See after visit summary for information provided to patient and family  Provisions for Follow-Up Care:  See after visit summary for information related to follow-up care and any pertinent home health orders  Disposition:   Home    Planned Readmission: No     Discharge Statement:  I spent 50 minutes discharging the patient  This time was spent on the day of discharge  I had direct contact with the patient on the day of discharge  Greater than 50% of the total time was spent examining patient, answering all patient questions, arranging and discussing plan of care with patient as well as directly providing post-discharge instructions  Additional time then spent on discharge activities  Discharge Medications:  See after visit summary for reconciled discharge medications provided to patient and/or family        **Please Note: This note may have been constructed using a voice recognition system**

## 2023-04-25 NOTE — ASSESSMENT & PLAN NOTE
· Patient is on Accutane, recently started 2 months ago along with OCP  · Potential etiology for elevated LFTs  · Accutane discontinued and patient should avoid taking this again in the future  · Continue follow up with PCP or dermatology for further recommendations

## 2023-04-26 ENCOUNTER — TELEPHONE (OUTPATIENT)
Dept: OTHER | Facility: HOSPITAL | Age: 18
End: 2023-04-26

## 2023-04-26 LAB
ACTIN IGG SERPL-ACNC: 23 UNITS (ref 0–19)
CMV IGG SERPL IA-ACNC: 2.8 U/ML (ref 0–0.59)
CMV IGM SERPL IA-ACNC: <30 AU/ML (ref 0–29.9)
HAPTOGLOB SERPL-MCNC: <10 MG/DL (ref 33–278)
MITOCHONDRIA M2 IGG SER-ACNC: <20 UNITS (ref 0–20)
MYOGLOBIN UR-MCNC: <2 NG/ML (ref 0–13)

## 2023-04-26 NOTE — UTILIZATION REVIEW
NOTIFICATION OF ADMISSION DISCHARGE   This is a Notification of Discharge from [facility]  Please be advised that this patient has been discharge from our facility  Below you will find the admission and discharge date and time including the patient’s disposition  UTILIZATION REVIEW CONTACT:  [address] Maria Fernanda  Utilization   Network Utilization Review Department  Phone: [phone] x carefully listen to the prompts  All voicemails are confidential   Email: [email or web address]     ADMISSION INFORMATION  PRESENTATION DATE: 4/22/2023 11:41 AM  OBERVATION ADMISSION DATE:   INPATIENT ADMISSION DATE: 4/22/23  4:06 PM   DISCHARGE DATE: 4/25/2023  2:00 PM   DISPOSITION:Home/Self Care    IMPORTANT INFORMATION:  Send all requests for admission clinical reviews, approved or denied determinations and any other requests to dedicated fax number below belonging to the campus where the patient is receiving treatment   List of dedicated fax numbers:  [address] DENIALS (Administrative/Medical Necessity) [phone]   [address] (Maternity/NICU/Pediatrics) [phone]   [facility] [phone]   [place] [address] [phone]   [address] [phone]   [address] [facility] [phone] [address] [facility] [phone]   [address] [place] [address] 119 [phone]   [facility]  [phone]   [address] [facility] [phone]   [address] [facility] [address] [facility] [phone]

## 2023-04-28 LAB
A PHAGOCYTOPH IGG TITR SER IF: NEGATIVE {TITER}
A PHAGOCYTOPH IGM TITR SER IF: NEGATIVE {TITER}
B MICROTI IGG TITR SER: NORMAL {TITER}
B MICROTI IGM TITR SER: NORMAL {TITER}
CARDIOLIPIN IGA SER IA-ACNC: 8.5
CARDIOLIPIN IGG SER IA-ACNC: 6.7
CARDIOLIPIN IGM SER IA-ACNC: 7.1
E CHAFFEENSIS IGG TITR SER IF: NEGATIVE {TITER}
E CHAFFEENSIS IGM TITR SER IF: NEGATIVE {TITER}

## 2023-05-01 LAB — F5 GENE MUT ANL BLD/T: NORMAL

## 2023-05-02 ENCOUNTER — TELEPHONE (OUTPATIENT)
Dept: HEMATOLOGY ONCOLOGY | Facility: CLINIC | Age: 18
End: 2023-05-02

## 2023-05-02 NOTE — TELEPHONE ENCOUNTER
Appointment Change  Cancel, Reschedule, Change to Virtual      Who are you speaking with? Patient   If it is not the patient, are they listed on an active communication consent form? N/A   Which provider is the appointment scheduled with? Angelic Pino PA-C   When is the appointment scheduled? Please list date and time  05/09/23 9AM   At which location is the appointment scheduled to take place? [facility]   Was the appointment rescheduled or changed from an in person visit to a virtual visit? If so, please list the details of the change  06/22/23 9AM   What is the reason for the appointment change? Scheduling conflict   Was [facility] transport scheduled for this visit? No   Does [facility] transport need to be scheduled for the new visit (if applicable) N/A   Does the patient need an infusion appointment rescheduled? No   Does the patient have an infusion appointment scheduled? If so, when? No   Is the patient undergoing chemotherapy? No   Was the no-show policy reviewed for appointments being changed with less then 24 hours of notice?  N/A

## 2023-05-05 LAB — A PHAGOCYTOPH DNA BLD QL NAA+PROBE: NEGATIVE

## 2023-05-17 ENCOUNTER — TELEPHONE (OUTPATIENT)
Dept: HEMATOLOGY ONCOLOGY | Facility: CLINIC | Age: 18
End: 2023-05-17

## 2023-05-17 NOTE — TELEPHONE ENCOUNTER
Appointment Confirmation   Who are you speaking with? Patient   If it is not the patient, are they listed on an active communication consent form? N/A   Which provider is the appointment scheduled with? Angelic Pino PA-C   When is the appointment scheduled? Please list date and time 6/22/23 @ 9am   At which location is the appointment scheduled to take place? [facility]   Did caller verbalize understanding of appointment details?  Yes

## 2023-06-22 ENCOUNTER — TELEPHONE (OUTPATIENT)
Dept: HEMATOLOGY ONCOLOGY | Facility: CLINIC | Age: 18
End: 2023-06-22

## 2023-06-22 NOTE — TELEPHONE ENCOUNTER
Called patient to reschedule appt    Left message with [facility] tel number

## 2023-09-20 ENCOUNTER — HOSPITAL ENCOUNTER (OUTPATIENT)
Facility: CLINIC | Age: 18
Discharge: HOME | End: 2023-09-20
Attending: FAMILY MEDICINE
Payer: COMMERCIAL

## 2023-09-20 VITALS
TEMPERATURE: 97.9 F | OXYGEN SATURATION: 99 % | DIASTOLIC BLOOD PRESSURE: 77 MMHG | HEART RATE: 79 BPM | SYSTOLIC BLOOD PRESSURE: 124 MMHG

## 2023-09-20 DIAGNOSIS — R30.0 DYSURIA: Primary | ICD-10-CM

## 2023-09-20 PROCEDURE — 87491 CHLMYD TRACH DNA AMP PROBE: CPT | Performed by: FAMILY MEDICINE

## 2023-09-20 PROCEDURE — 87086 URINE CULTURE/COLONY COUNT: CPT | Performed by: FAMILY MEDICINE

## 2023-09-20 PROCEDURE — 99213 OFFICE O/P EST LOW 20 MIN: CPT | Performed by: FAMILY MEDICINE

## 2023-09-20 PROCEDURE — S9083 URGENT CARE CENTER GLOBAL: HCPCS | Performed by: FAMILY MEDICINE

## 2023-09-20 RX ORDER — NITROFURANTOIN 25; 75 MG/1; MG/1
100 CAPSULE ORAL 2 TIMES DAILY
Qty: 6 CAPSULE | Refills: 0 | Status: SHIPPED | OUTPATIENT
Start: 2023-09-20 | End: 2023-09-23

## 2023-09-20 RX ORDER — ESCITALOPRAM OXALATE 10 MG/1
1 TABLET ORAL EVERY MORNING
COMMUNITY
Start: 2023-06-14 | End: 2024-04-10 | Stop reason: ALTCHOICE

## 2023-09-20 NOTE — ED PROVIDER NOTES
History  Chief Complaint   Patient presents with    Urinary Frequency     Urine frequency and pain with urinating started yesterday     Pt here with urinary frequency and mild burning.  Symptoms for 2 days.  No back pain or fever.  Denies vaginal discharge          No past medical history on file.    No past surgical history on file.    No family history on file.    Social History     Tobacco Use    Smoking status: Never    Smokeless tobacco: Never   Vaping Use    Vaping Use: Never used   Substance Use Topics    Alcohol use: Yes    Drug use: Never       Review of Systems    Physical Exam  ED Triage Vitals [09/20/23 0914]   Temp Heart Rate Resp BP SpO2   36.6 °C (97.9 °F) 79 -- 124/77 99 %      Temp Source Heart Rate Source Patient Position BP Location FiO2 (%) (Set)   Oral Monitor Sitting Right upper arm --       Physical Exam  Constitutional:       Appearance: Normal appearance.   Cardiovascular:      Rate and Rhythm: Normal rate and regular rhythm.      Pulses: Normal pulses.      Heart sounds: Normal heart sounds.   Pulmonary:      Effort: Pulmonary effort is normal.      Breath sounds: Normal breath sounds.   Abdominal:      General: Abdomen is flat.      Palpations: Abdomen is soft.      Tenderness: There is no right CVA tenderness or left CVA tenderness.   Neurological:      Mental Status: She is alert.           Procedures  Procedures    UC Course       Medical Decision Making  Benign exam, positive dip, likely UTI  Cover with macrobid  Follow up in 3-4 days if not resolving  Pt took azo so no urine dip possible  Will send GC chlamydia and Urine culture to further ensure proper dx                   Cricket Alberto,   09/20/23 8306

## 2023-09-20 NOTE — Clinical Note
Skylar Reynolds was seen and treated in our urgent care on 9/20/2023.  She may return to work on 09/21/2023.       If you have any questions or concerns, please don't hesitate to call.      Cricket Alberto, DO

## 2023-09-21 LAB
C TRACH RRNA SPEC QL NAA+PROBE: NEGATIVE
N GONORRHOEA RRNA SPEC QL NAA+PROBE: NEGATIVE

## 2023-09-22 LAB — BACTERIA UR CULT: NORMAL

## 2024-04-10 ENCOUNTER — HOSPITAL ENCOUNTER (OUTPATIENT)
Facility: CLINIC | Age: 19
Discharge: HOME | End: 2024-04-10
Attending: FAMILY MEDICINE
Payer: COMMERCIAL

## 2024-04-10 VITALS
DIASTOLIC BLOOD PRESSURE: 79 MMHG | HEART RATE: 105 BPM | OXYGEN SATURATION: 95 % | TEMPERATURE: 99.2 F | SYSTOLIC BLOOD PRESSURE: 123 MMHG

## 2024-04-10 DIAGNOSIS — J06.9 VIRAL UPPER RESPIRATORY TRACT INFECTION: Primary | ICD-10-CM

## 2024-04-10 LAB
EXPIRATION DATE: NORMAL
EXPIRATION DATE: NORMAL
FLUAV RNA SPEC QL NAA+PROBE: NEGATIVE
FLUBV RNA SPEC QL NAA+PROBE: NEGATIVE
Lab: NORMAL
Lab: NORMAL
POCT MANUFACTURER: NORMAL
POCT MANUFACTURER: NORMAL
RAPID INFLUENZA A AGN: NEGATIVE
RAPID INFLUENZA B AGN: NEGATIVE
RSV RNA SPEC QL NAA+PROBE: NEGATIVE
S PYO AG THROAT QL: NEGATIVE
SARS-COV-2 RNA RESP QL NAA+PROBE: NEGATIVE

## 2024-04-10 PROCEDURE — 87637 SARSCOV2&INF A&B&RSV AMP PRB: CPT | Performed by: FAMILY MEDICINE

## 2024-04-10 PROCEDURE — 99213 OFFICE O/P EST LOW 20 MIN: CPT | Performed by: FAMILY MEDICINE

## 2024-04-10 PROCEDURE — 87804 INFLUENZA ASSAY W/OPTIC: CPT | Performed by: FAMILY MEDICINE

## 2024-04-10 PROCEDURE — 87880 STREP A ASSAY W/OPTIC: CPT | Performed by: FAMILY MEDICINE

## 2024-04-10 PROCEDURE — S9083 URGENT CARE CENTER GLOBAL: HCPCS | Performed by: FAMILY MEDICINE

## 2024-04-10 RX ORDER — SPIRONOLACTONE 50 MG/1
50 TABLET, FILM COATED ORAL DAILY
COMMUNITY

## 2024-04-10 ASSESSMENT — ENCOUNTER SYMPTOMS
HEADACHES: 1
NUMBNESS: 0
VOICE CHANGE: 1
SHORTNESS OF BREATH: 0
COUGH: 1
MYALGIAS: 0
FEVER: 1
PALPITATIONS: 0
FATIGUE: 0
NAUSEA: 0
CHEST TIGHTNESS: 0
ABDOMINAL PAIN: 0
SORE THROAT: 1
SINUS PAIN: 0
VOMITING: 0
CHILLS: 0
DIZZINESS: 0
SINUS PRESSURE: 1

## 2024-04-10 NOTE — ED PROVIDER NOTES
History  Chief Complaint   Patient presents with    Cough    Fever     Felt feverish    Headache    Sore Throat     Skylar is a 17 yo female presenting with flu-like symptoms  for 2 days.         History provided by:  Patient   used: No    Cough  Associated symptoms: fever, headaches and sore throat    Associated symptoms: no chest pain, no chills, no myalgias and no shortness of breath    Fever  Associated symptoms: congestion, cough, headaches and sore throat    Associated symptoms: no chest pain, no chills, no myalgias, no nausea and no vomiting    Headache  Associated symptoms: congestion, cough, drainage, fever, sinus pressure and sore throat    Associated symptoms: no abdominal pain, no dizziness, no fatigue, no myalgias, no nausea, no numbness and no vomiting    Sore Throat  Associated symptoms: cough, fever, headaches, postnasal drip and voice change    Associated symptoms: no abdominal pain, no chest pain, no chills and no shortness of breath        No past medical history on file.    No past surgical history on file.    No family history on file.    Social History     Tobacco Use    Smoking status: Never    Smokeless tobacco: Never   Vaping Use    Vaping Use: Never used   Substance Use Topics    Alcohol use: Yes    Drug use: Never       Review of Systems   Constitutional:  Positive for fever. Negative for chills and fatigue.   HENT:  Positive for congestion, postnasal drip, sinus pressure, sore throat and voice change. Negative for sinus pain and sneezing.    Respiratory:  Positive for cough. Negative for chest tightness and shortness of breath.    Cardiovascular:  Negative for chest pain and palpitations.   Gastrointestinal:  Negative for abdominal pain, nausea and vomiting.   Musculoskeletal:  Negative for myalgias.   Neurological:  Positive for headaches. Negative for dizziness and numbness.       Physical Exam  ED Triage Vitals [04/10/24 1307]   Temp Heart Rate Resp BP SpO2   37.3  °C (99.2 °F) (!) 105 -- 123/79 95 %      Temp src Heart Rate Source Patient Position BP Location FiO2 (%) (Set)   -- -- Sitting Right upper arm --       Physical Exam  Constitutional:       General: She is not in acute distress.     Appearance: Normal appearance. She is not ill-appearing.   HENT:      Head: Normocephalic.      Nose: Congestion present.   Eyes:      Extraocular Movements: Extraocular movements intact.      Conjunctiva/sclera: Conjunctivae normal.   Cardiovascular:      Rate and Rhythm: Normal rate and regular rhythm.      Pulses: Normal pulses.      Heart sounds: Normal heart sounds. No murmur heard.     No friction rub. No gallop.   Pulmonary:      Effort: Pulmonary effort is normal. No respiratory distress.      Breath sounds: No wheezing, rhonchi or rales.   Musculoskeletal:         General: Normal range of motion.      Cervical back: Normal range of motion.   Lymphadenopathy:      Cervical: No cervical adenopathy.   Skin:     General: Skin is warm.      Capillary Refill: Capillary refill takes less than 2 seconds.   Neurological:      General: No focal deficit present.      Mental Status: She is alert and oriented to person, place, and time.           Procedures  Procedures    UC Course       Medical Decision Making  Rapid flu and strep neg, Throat C&S sent.   COVID/Flu/RSV swab sent.   Recommending self-quarantine until symptoms resolve.   For pain and/or fever:  Tylenol doses every 6hrs      Helpful URI recommendations and tips provided on discharge.   Recommending Nasal saline, Salt Water gargle  Recommending seeking care in the Emergency department for further evaluation and treatment if symptoms worsen.                              Miguel Jade DO  04/10/24 9707

## 2024-04-10 NOTE — Clinical Note
Skylar Reynolds was seen and treated in our urgent care on 4/10/2024.  She may return to school on 04/12/2024.  Please excuse Ms Reynolds  for her absence due to illness. Should her COVID/Flu/RSV testing return negative, she/he can return to school on 04/12/24, sooner if able. If her results return positive, she is to adhere to the CDC quarantine guidelines.     If Skylar requires additional restriction, it is recommended that she call to schedule follow up with her PCP.       If you have any questions or concerns, please don't hesitate to call.      Miguel Jade, DO

## 2024-04-10 NOTE — DISCHARGE INSTRUCTIONS
"We will contact you with your lab results once they return. You may also locate your test results on you [employer] nishant! :)  Recommending self-quarantine until your symptoms resolve.  __________________________________________________________    Recommending staying home for at least 24 hours after your fever (100 degrees Fahrenheit or 37.8 degrees Celsius) is gone. Your fever should be gone without the need to use a fever-reducing medicine.   Per CDC guidelines, you can return to work or regular activities if your symptoms are mild and improving and you have not had a fever in 24hrs without medication.   The CDC still recommends those with moderate symptoms stay home.   If symptoms remain moderate or worsen, it is recommended that you self-isolate for 5-6 days from the onset of symptoms.   Mask wearing is recommended for up to 10 days from the onset of symptoms, especially when around others to minimize the risk of infecting others  __________________________________________________________    For pain and/or fever:     You may alternate Ibuprofen with Tylenol every 3-4 hours, as needed. (Ex: Ibuprofen at 8am, Tylenol at 11am, Ibuprofen at 2pm, etc).       If your symptoms persist or worsen, we recommend seeking care in the Emergency department for further evaluation and treatment.   __________________________________________________________    When we are sick, imagine yourself as a candlestick.   As a candlestick burns, wet wax builds at the top (that's mucous building up in our sinuses!).   The wax begins to drip down the side of the candle (just as mucous drips down the back of our throat, \"Post-Nasal Drip\"!)  Melting wax drips and forms a layer along the candle that leads to the base of the candle. Wet wax begins to collect at the base (the base of our candle is our vocal cords, which leads to throat clearing and worsening coughing!)  Why do we cough more at bedtime? If you tilt a candle sideways, wet wax pours " even faster (mucous drips faster when we lie down for bed!)    How to feel better:  Get the wax off of your candle! (Rinse that mucous out of your sinuses and off the back of your throat to limit how much collects on your vocal cords!)  __________________________________________________________    Remember: No matter what, START WITH SALINE!   It effectively treats the mucous that is contributing to many of your symptoms (ie- congestion, post-nasal drip, sore throat, hoarseness, cough)!     **REMEMBER** Treat the PROBLEM (mucous) and your SYMPTOMS (cough, sore throat, congestion) will improve! The best remedy for mucous isn’t OTC medications, it’s saline (salt water; gargling AND sinus rinsing)!  Whatever mucous you rinse out early in your illness will decrease the chance of persistent lingering cough!    I personally recommend NeilMed All-In-One (find this over the counter or on Amazon), or NeilMed squeeze bottle (remember, don't squeeze too hard!), please only use distilled water.   You can use this up to every 3-4hrs, 30 mins before bed and first thing in the AM.     Salt Water gargles (tap water and table salt, make it ocean salty)- as often as needed throughout the day.     __________________________________________________________    Practice proper hand hygiene.     Oil Diffuser with essential oils such as lavender, camphor, eucalyptus, theives. Thieves oil is very helpful! A few drops in an oil diffuser can provide significant relief!   __________________________________________________________  OTC medications recommended:   Recommending Theraflu Day & Nighttime medication; the type you mix in hot water like a tea. Make sure not to take tylenol while taking Theraflu, as if already contains tylenol.     For Severe Congestion- Recommending Afrin, Extra Moisturizing No Drip(oxymetazoline) for additional relief.  You may use it NO MORE than twice daily for NO MORE than 3 days MAX!  Refrain from using other  "nasal steroid (Nasacort, Rhinocort, Flonase) until you are no longer using Afrin    __________________________________________________________    For Natural Remedies:   Recommending making a \"Flu Bomb\". Check out [employer] for the recipe!    You can prepare thyme as a tea by steeping 1 tablespoon of fresh chopped thyme or 1 teaspoon of dried thyme in 1 cup of boiling water for 10-20 minutes. Cover the tea while it steeps. Add honey to increase effectiveness before consuming. You can also prepare thyme-infused honey to relieve cold symptoms.   __________________________________________________________    Please let us know about your experience today!   Your input is truly appreciated and helps Dr. Jade and your team at [facility] to continue to provide a superior level of service!   Get Well Soon!      We wish you a very prosperous & healthy 2024!!    "

## 2024-04-23 ENCOUNTER — HOSPITAL ENCOUNTER (OUTPATIENT)
Facility: CLINIC | Age: 19
Discharge: HOME | End: 2024-04-23
Attending: FAMILY MEDICINE
Payer: COMMERCIAL

## 2024-04-23 VITALS
TEMPERATURE: 98.5 F | HEART RATE: 98 BPM | HEIGHT: 63 IN | BODY MASS INDEX: 25.69 KG/M2 | WEIGHT: 145 LBS | SYSTOLIC BLOOD PRESSURE: 126 MMHG | DIASTOLIC BLOOD PRESSURE: 84 MMHG

## 2024-04-23 DIAGNOSIS — J06.9 VIRAL UPPER RESPIRATORY TRACT INFECTION: Primary | ICD-10-CM

## 2024-04-23 PROCEDURE — S9083 URGENT CARE CENTER GLOBAL: HCPCS | Performed by: FAMILY MEDICINE

## 2024-04-23 PROCEDURE — 99213 OFFICE O/P EST LOW 20 MIN: CPT | Performed by: FAMILY MEDICINE

## 2024-04-23 RX ORDER — AZITHROMYCIN 250 MG/1
250 TABLET, FILM COATED ORAL DAILY
Qty: 6 TABLET | Refills: 0 | Status: SHIPPED | OUTPATIENT
Start: 2024-04-23 | End: 2024-04-23

## 2024-04-23 RX ORDER — AZITHROMYCIN 250 MG/1
250 TABLET, FILM COATED ORAL DAILY
Qty: 6 TABLET | Refills: 0 | Status: SHIPPED | OUTPATIENT
Start: 2024-04-23 | End: 2024-04-28

## 2024-04-23 ASSESSMENT — ENCOUNTER SYMPTOMS
DIZZINESS: 0
SORE THROAT: 0
VOMITING: 0
CHEST TIGHTNESS: 0
NUMBNESS: 0
MYALGIAS: 0
FATIGUE: 0
ABDOMINAL PAIN: 0
FEVER: 0
SINUS PAIN: 0
NAUSEA: 0
PALPITATIONS: 0
SINUS PRESSURE: 1
VOICE CHANGE: 1
COUGH: 1
SHORTNESS OF BREATH: 0
CHILLS: 0

## 2024-04-23 NOTE — DISCHARGE INSTRUCTIONS
"When we are sick, imagine yourself as a candlestick.   As a candlestick burns, wet wax builds at the top (that's mucous building up in our sinuses!).   The wax begins to drip down the side of the candle (just as mucous drips down the back of our throat, \"Post-Nasal Drip\"!)  Melting wax drips and forms a layer along the candle that leads to the base of the candle. Wet wax begins to collect at the base (the base of our candle is our vocal cords, which leads to throat clearing and worsening coughing!)  Why do we cough more at bedtime? If you tilt a candle sideways, wet wax pours even faster (mucous drips faster when we lie down for bed!)    How to feel better:  Get the wax off of your candle! (Rinse that mucous out of your sinuses and off the back of your throat to limit how much collects on your vocal cords!)  __________________________________________________________    Remember: No matter what, START WITH SALINE!   It effectively treats the mucous that is contributing to many of your symptoms (ie- congestion, post-nasal drip, sore throat, hoarseness, cough)!     **REMEMBER** Treat the PROBLEM (mucous) and your SYMPTOMS (cough, sore throat, congestion) will improve! The best remedy for mucous isn’t OTC medications, it’s saline (salt water; gargling AND sinus rinsing)!  Whatever mucous you rinse out early in your illness will decrease the chance of persistent lingering cough!    I personally recommend NeilMed All-In-One (find this over the counter or on Amazon), or NeilMed squeeze bottle (remember, don't squeeze too hard!), please only use distilled water.   You can use this up to every 3-4hrs, 30 mins before bed and first thing in the AM.     Salt Water gargles (tap water and table salt, make it ocean salty)- as often as needed throughout the day.     __________________________________________________________    Please be aware, start to finish, a viral illness can last 10-14 days. If your symptoms are not improving " "in this time, please return for further evaluation. And remember, antibiotics do not treat viruses.     If you are still coughing, please continue wearing a mask when around others to minimize the risk of infecting others.   __________________________________________________________    Practice proper hand hygiene.     Oil Diffuser with essential oils such as lavender, camphor, eucalyptus, and thieves oil can be very helpful! A few drops in an oil diffuser can provide significant relief!   __________________________________________________________      __________________________________________________________      For Natural Remedies:   Recommending making a \"Flu Bomb\". Check out [employer] for the recipe!    You can prepare thyme as a tea by steeping 1 tablespoon of fresh chopped thyme or 1 teaspoon of dried thyme in 1 cup of boiling water for 10-20 minutes. Cover the tea while it steeps. Add honey to increase effectiveness before consuming. You can also prepare thyme-infused honey to relieve cold symptoms.   __________________________________________________________     You have been prescribed an antibiotic, only fill and take if symptoms progress/worsen.     Once started, please complete full course of antibiotic to prevent creating resistance within the bugs causing your illness.       Please take a daily Probiotic for GI health while taking antibiotics.  Recommending the probiotic Digestive Advantage (safe for patients 3yrs and older).    Remember to take the Probiotic *no sooner* than 4 hours *after* taking your antibiotic.  If you take the probiotic sooner than that, the good bacteria will likely be killed by the antibiotic.   __________________________________________________________    Please let us know about your experience today!   Your input is truly appreciated and helps Dr. Jade and your team at [facility] to continue to provide a superior level of service!   Get Well Soon!      "

## 2024-04-23 NOTE — ED PROVIDER NOTES
History  No chief complaint on file.    Skylar is a 19 yo female presenting with return of cold symptoms after approx 1 week of resolution.  This round began 3-4 days ago.         History provided by:  Patient   used: No        No past medical history on file.    No past surgical history on file.    No family history on file.    Social History     Tobacco Use    Smoking status: Never    Smokeless tobacco: Never   Vaping Use    Vaping Use: Never used   Substance Use Topics    Alcohol use: Yes    Drug use: Never       Review of Systems   Constitutional:  Negative for chills, fatigue and fever.   HENT:  Positive for congestion, postnasal drip, sinus pressure and voice change. Negative for sinus pain, sneezing and sore throat.    Respiratory:  Positive for cough. Negative for chest tightness and shortness of breath.    Cardiovascular:  Negative for chest pain and palpitations.   Gastrointestinal:  Negative for abdominal pain, nausea and vomiting.   Musculoskeletal:  Negative for myalgias.   Neurological:  Negative for dizziness and numbness.       Physical Exam  ED Triage Vitals [04/23/24 1548]   Temp Heart Rate Resp BP SpO2   36.9 °C (98.5 °F) 98 -- 126/84 --      Temp src Heart Rate Source Patient Position BP Location FiO2 (%) (Set)   -- -- -- -- --       Physical Exam  Constitutional:       General: She is not in acute distress.     Appearance: Normal appearance. She is not ill-appearing.   HENT:      Head: Normocephalic.      Nose: Congestion present.   Eyes:      Extraocular Movements: Extraocular movements intact.      Conjunctiva/sclera: Conjunctivae normal.   Cardiovascular:      Rate and Rhythm: Normal rate and regular rhythm.      Pulses: Normal pulses.      Heart sounds: Normal heart sounds. No murmur heard.     No friction rub. No gallop.   Pulmonary:      Effort: Pulmonary effort is normal. No respiratory distress.      Breath sounds: No wheezing, rhonchi or rales.   Musculoskeletal:          General: Normal range of motion.      Cervical back: Normal range of motion.   Lymphadenopathy:      Cervical: No cervical adenopathy.   Skin:     General: Skin is warm.      Capillary Refill: Capillary refill takes less than 2 seconds.   Neurological:      General: No focal deficit present.      Mental Status: She is alert and oriented to person, place, and time.           Procedures  Procedures    UC Course       Medical Decision Making  Exam shows PND, boggy turbinates and congestion. Lungs-CTAB, no w/r/r, CV-No m/r/g, RRR. Otherwise unremarkable.   URI, likely viral, low suspicion/concern for COVID, declined testing at this time.   Helpful URI recommendations and tips provided on discharge.   Recommending Nasal saline, Salt Water gargle  Urged to seek immediate medical treatment should symptoms worsen.    Oral Antibiotic prescription printed as prn. The patient verbally understands the signs & symptoms to monitor to warrant starting oral antibiotic.   Recommending a daily yogurt or Probiotic for GI health while taking antibiotics.  To take the Probiotic *no sooner* than 4 hours *after* taking the antibiotic to prevent the good bacteria from being killed by the antibiotic.                        Miguel Jade,   04/23/24 4331

## 2024-04-23 NOTE — Clinical Note
Skylar Reynolds was seen and treated in our urgent care on 4/23/2024.  She may return to school on 04/25/2024.  Please excuse Skylar from school due to illness. She may return on 04/25/24, sooner if able.    If she requires additional restriction, it is recommended that  she call to schedule follow up with her PCP for further evaluation and treatment.         If you have any questions or concerns, please don't hesitate to call.      Miguel Jade, DO

## 2024-06-21 ENCOUNTER — HOSPITAL ENCOUNTER (OUTPATIENT)
Facility: CLINIC | Age: 19
Discharge: HOME | End: 2024-06-21
Attending: EMERGENCY MEDICINE
Payer: COMMERCIAL

## 2024-06-21 VITALS
HEART RATE: 85 BPM | TEMPERATURE: 98 F | SYSTOLIC BLOOD PRESSURE: 114 MMHG | DIASTOLIC BLOOD PRESSURE: 77 MMHG | HEIGHT: 62 IN | BODY MASS INDEX: 23.92 KG/M2 | WEIGHT: 130 LBS | OXYGEN SATURATION: 96 %

## 2024-06-21 DIAGNOSIS — R30.0 DYSURIA: Primary | ICD-10-CM

## 2024-06-21 LAB
BILIRUBIN, POC: NEGATIVE
BLOOD URINE, POC: NEGATIVE
CLARITY, POC: CLEAR
COLOR, POC: NORMAL
EXPIRATION DATE: NORMAL
EXPIRATION DATE: NORMAL
GLUCOSE URINE, POC: NEGATIVE
KETONES, POC: NORMAL
LEUKOCYTE EST, POC: NEGATIVE
Lab: NORMAL
Lab: NORMAL
NITRITE, POC: NEGATIVE
PH, POC: 5
POCT MANUFACTURER: NORMAL
POCT MANUFACTURER: NORMAL
PREGNANCY TEST URINE, POC: NEGATIVE
PROTEIN, POC: NEGATIVE
SPECIFIC GRAVITY, POC: 1
UROBILINOGEN, POC: 1

## 2024-06-21 PROCEDURE — 87086 URINE CULTURE/COLONY COUNT: CPT | Performed by: NURSE PRACTITIONER

## 2024-06-21 PROCEDURE — 81025 URINE PREGNANCY TEST: CPT | Performed by: NURSE PRACTITIONER

## 2024-06-21 PROCEDURE — 81002 URINALYSIS NONAUTO W/O SCOPE: CPT | Performed by: NURSE PRACTITIONER

## 2024-06-21 PROCEDURE — S9083 URGENT CARE CENTER GLOBAL: HCPCS | Performed by: NURSE PRACTITIONER

## 2024-06-21 PROCEDURE — 99213 OFFICE O/P EST LOW 20 MIN: CPT | Performed by: NURSE PRACTITIONER

## 2024-06-21 RX ORDER — NITROFURANTOIN 25; 75 MG/1; MG/1
100 CAPSULE ORAL 2 TIMES DAILY
Qty: 10 CAPSULE | Refills: 0 | Status: SHIPPED | OUTPATIENT
Start: 2024-06-21 | End: 2024-06-26

## 2024-06-21 ASSESSMENT — ENCOUNTER SYMPTOMS
DYSURIA: 1
FREQUENCY: 1

## 2024-06-22 NOTE — ED PROVIDER NOTES
Emergency Medicine Note  HPI   HISTORY OF PRESENT ILLNESS     P/w dysuria and urinary frequency since yesterday  Denies fever, back pain, N/V, abdominal pain  Took AZO            Patient History   PAST HISTORY     Reviewed from Nursing Triage:       No past medical history on file.    No past surgical history on file.    No family history on file.    Social History     Tobacco Use    Smoking status: Never    Smokeless tobacco: Never   Vaping Use    Vaping Use: Never used   Substance Use Topics    Alcohol use: Yes    Drug use: Never         Review of Systems   REVIEW OF SYSTEMS     Review of Systems   Genitourinary:  Positive for dysuria and frequency.         VITALS     ED Vitals      Date/Time Temp Pulse Resp BP SpO2 [facility]   06/21/24 2007 36.7 °C (98 °F) 85 -- 114/77 96 % LB                         Physical Exam   PHYSICAL EXAM     Physical Exam  Constitutional:       General: She is not in acute distress.  HENT:      Head: Normocephalic.   Cardiovascular:      Rate and Rhythm: Normal rate and regular rhythm.      Heart sounds: Normal heart sounds.   Pulmonary:      Effort: Pulmonary effort is normal.      Breath sounds: Normal breath sounds.   Abdominal:      General: Bowel sounds are normal.      Palpations: Abdomen is soft.      Tenderness: There is no abdominal tenderness. There is no right CVA tenderness, left CVA tenderness, guarding or rebound.   Skin:     General: Skin is warm and dry.   Neurological:      General: No focal deficit present.      Mental Status: She is alert.   Psychiatric:         Mood and Affect: Mood normal.         Behavior: Behavior normal.           PROCEDURES     Procedures     DATA     Results       None                No orders to display       Scoring tools                                  ED Course & MDM   MDM / ED COURSE / CLINICAL IMPRESSION / DISPO     Medical Decision Making  UTI symptoms:  -UA unreliable as pt took AZO  -urine culture collected  -urine preg negative  -Macrobid  BID x5 days  -tylenol prn  -ensure adequate hydration  -f/u PCP if symptoms do not improve             Clinical Impression      Dysuria     _________________       ED Disposition   Discharge                       Felicia Mujica CRNP  06/21/24 2048

## 2024-06-22 NOTE — DISCHARGE INSTRUCTIONS
I prescribed you Macrobid for your UTI symptoms.  Take as prescribed.  We collected a urine culture.  We will call you with the results.  Take Tylenol as needed.  Ensure adequate hydration.  Follow-up with your PCP if symptoms do not improve.

## 2024-06-23 LAB — BACTERIA UR CULT: NORMAL

## 2024-06-24 NOTE — ED ATTESTATION NOTE
The patient was evaluated and managed by the nurse practitioner.  I was present in the office and available for consultation if needed  I have reviewed and agree with the diagnosis and treatment plan.      Gloria Mason,   06/24/24 0915